# Patient Record
Sex: MALE | Race: ASIAN | NOT HISPANIC OR LATINO | Employment: OTHER | URBAN - METROPOLITAN AREA
[De-identification: names, ages, dates, MRNs, and addresses within clinical notes are randomized per-mention and may not be internally consistent; named-entity substitution may affect disease eponyms.]

---

## 2017-01-13 ENCOUNTER — HOSPITAL ENCOUNTER (OUTPATIENT)
Dept: LAB | Facility: MEDICAL CENTER | Age: 67
End: 2017-01-13
Attending: NURSE PRACTITIONER
Payer: COMMERCIAL

## 2017-01-13 LAB
ALBUMIN SERPL BCP-MCNC: 4.7 G/DL (ref 3.2–4.9)
ALBUMIN/GLOB SERPL: 1.9 G/DL
ALP SERPL-CCNC: 35 U/L (ref 30–99)
ALT SERPL-CCNC: 27 U/L (ref 2–50)
ANION GAP SERPL CALC-SCNC: 10 MMOL/L (ref 0–11.9)
AST SERPL-CCNC: 18 U/L (ref 12–45)
BILIRUB SERPL-MCNC: 1 MG/DL (ref 0.1–1.5)
BUN SERPL-MCNC: 18 MG/DL (ref 8–22)
CALCIUM SERPL-MCNC: 9.9 MG/DL (ref 8.5–10.5)
CHLORIDE SERPL-SCNC: 100 MMOL/L (ref 96–112)
CHOLEST SERPL-MCNC: 198 MG/DL (ref 100–199)
CO2 SERPL-SCNC: 27 MMOL/L (ref 20–33)
CREAT SERPL-MCNC: 0.96 MG/DL (ref 0.5–1.4)
CREAT UR-MCNC: 69 MG/DL
EST. AVERAGE GLUCOSE BLD GHB EST-MCNC: 137 MG/DL
GLOBULIN SER CALC-MCNC: 2.5 G/DL (ref 1.9–3.5)
GLUCOSE SERPL-MCNC: 125 MG/DL (ref 65–99)
HBA1C MFR BLD: 6.4 % (ref 0–5.6)
HDLC SERPL-MCNC: 64 MG/DL
LDLC SERPL CALC-MCNC: 122 MG/DL
MICROALBUMIN UR-MCNC: <0.7 MG/DL
MICROALBUMIN/CREAT UR: NORMAL MG/G (ref 0–30)
POTASSIUM SERPL-SCNC: 4 MMOL/L (ref 3.6–5.5)
PROT SERPL-MCNC: 7.2 G/DL (ref 6–8.2)
SODIUM SERPL-SCNC: 137 MMOL/L (ref 135–145)
TRIGL SERPL-MCNC: 62 MG/DL (ref 0–149)

## 2017-01-13 PROCEDURE — 83036 HEMOGLOBIN GLYCOSYLATED A1C: CPT

## 2017-01-13 PROCEDURE — 82043 UR ALBUMIN QUANTITATIVE: CPT

## 2017-01-13 PROCEDURE — 80053 COMPREHEN METABOLIC PANEL: CPT

## 2017-01-13 PROCEDURE — 80061 LIPID PANEL: CPT

## 2017-01-13 PROCEDURE — 36415 COLL VENOUS BLD VENIPUNCTURE: CPT

## 2017-01-13 PROCEDURE — 82570 ASSAY OF URINE CREATININE: CPT

## 2017-09-07 ENCOUNTER — HOSPITAL ENCOUNTER (OUTPATIENT)
Dept: LAB | Facility: MEDICAL CENTER | Age: 67
End: 2017-09-07
Attending: NURSE PRACTITIONER
Payer: MEDICARE

## 2017-09-07 LAB
ALBUMIN SERPL BCP-MCNC: 4.3 G/DL (ref 3.2–4.9)
ALBUMIN/GLOB SERPL: 1.6 G/DL
ALP SERPL-CCNC: 33 U/L (ref 30–99)
ALT SERPL-CCNC: 20 U/L (ref 2–50)
ANION GAP SERPL CALC-SCNC: 7 MMOL/L (ref 0–11.9)
AST SERPL-CCNC: 20 U/L (ref 12–45)
BILIRUB SERPL-MCNC: 0.7 MG/DL (ref 0.1–1.5)
BUN SERPL-MCNC: 15 MG/DL (ref 8–22)
CALCIUM SERPL-MCNC: 9.9 MG/DL (ref 8.5–10.5)
CHLORIDE SERPL-SCNC: 97 MMOL/L (ref 96–112)
CHOLEST SERPL-MCNC: 161 MG/DL (ref 100–199)
CO2 SERPL-SCNC: 28 MMOL/L (ref 20–33)
CREAT SERPL-MCNC: 1.01 MG/DL (ref 0.5–1.4)
CREAT UR-MCNC: 18.3 MG/DL
EST. AVERAGE GLUCOSE BLD GHB EST-MCNC: 148 MG/DL
FASTING STATUS PATIENT QL REPORTED: NORMAL
GFR SERPL CREATININE-BSD FRML MDRD: >60 ML/MIN/1.73 M 2
GLOBULIN SER CALC-MCNC: 2.7 G/DL (ref 1.9–3.5)
GLUCOSE SERPL-MCNC: 92 MG/DL (ref 65–99)
HAV IGM SERPL QL IA: NEGATIVE
HBA1C MFR BLD: 6.8 % (ref 0–5.6)
HBV CORE IGM SER QL: NEGATIVE
HBV SURFACE AG SER QL: NEGATIVE
HCV AB SER QL: NEGATIVE
HDLC SERPL-MCNC: 56 MG/DL
LDLC SERPL CALC-MCNC: 89 MG/DL
MICROALBUMIN UR-MCNC: <0.7 MG/DL
MICROALBUMIN/CREAT UR: NORMAL MG/G (ref 0–30)
POTASSIUM SERPL-SCNC: 4.8 MMOL/L (ref 3.6–5.5)
PROT SERPL-MCNC: 7 G/DL (ref 6–8.2)
PSA SERPL-MCNC: 1.45 NG/ML (ref 0–4)
SODIUM SERPL-SCNC: 132 MMOL/L (ref 135–145)
TRIGL SERPL-MCNC: 78 MG/DL (ref 0–149)

## 2017-09-07 PROCEDURE — 36415 COLL VENOUS BLD VENIPUNCTURE: CPT

## 2017-09-07 PROCEDURE — 84153 ASSAY OF PSA TOTAL: CPT | Mod: GA

## 2017-09-07 PROCEDURE — 80061 LIPID PANEL: CPT

## 2017-09-07 PROCEDURE — 80053 COMPREHEN METABOLIC PANEL: CPT

## 2017-09-07 PROCEDURE — 82043 UR ALBUMIN QUANTITATIVE: CPT

## 2017-09-07 PROCEDURE — 82570 ASSAY OF URINE CREATININE: CPT

## 2017-09-07 PROCEDURE — 83036 HEMOGLOBIN GLYCOSYLATED A1C: CPT | Mod: GA

## 2017-09-07 PROCEDURE — 80074 ACUTE HEPATITIS PANEL: CPT | Mod: GA

## 2017-12-01 ENCOUNTER — HOSPITAL ENCOUNTER (OUTPATIENT)
Dept: RADIOLOGY | Facility: MEDICAL CENTER | Age: 67
End: 2017-12-01
Attending: NURSE PRACTITIONER
Payer: MEDICARE

## 2017-12-01 DIAGNOSIS — Z13.6 SCREENING FOR ISCHEMIC HEART DISEASE: ICD-10-CM

## 2017-12-01 PROCEDURE — 76775 US EXAM ABDO BACK WALL LIM: CPT

## 2018-05-05 ENCOUNTER — OFFICE VISIT (OUTPATIENT)
Dept: URGENT CARE | Facility: PHYSICIAN GROUP | Age: 68
End: 2018-05-05
Payer: MEDICARE

## 2018-05-05 VITALS
SYSTOLIC BLOOD PRESSURE: 112 MMHG | WEIGHT: 149.8 LBS | BODY MASS INDEX: 26.54 KG/M2 | TEMPERATURE: 98.1 F | OXYGEN SATURATION: 95 % | DIASTOLIC BLOOD PRESSURE: 60 MMHG | HEIGHT: 63 IN | HEART RATE: 65 BPM

## 2018-05-05 DIAGNOSIS — S00.83XA CONTUSION, CHEEK, INITIAL ENCOUNTER: ICD-10-CM

## 2018-05-05 DIAGNOSIS — H11.31 SUBCONJUNCTIVAL HEMORRHAGE OF RIGHT EYE: Primary | ICD-10-CM

## 2018-05-05 PROCEDURE — 99203 OFFICE O/P NEW LOW 30 MIN: CPT | Performed by: PHYSICIAN ASSISTANT

## 2018-05-05 RX ORDER — MELOXICAM 15 MG/1
15 TABLET ORAL DAILY
COMMUNITY
End: 2023-10-19

## 2018-05-05 RX ORDER — LISINOPRIL 10 MG/1
10 TABLET ORAL DAILY
COMMUNITY
End: 2018-07-23

## 2018-05-05 ASSESSMENT — ENCOUNTER SYMPTOMS
DIZZINESS: 0
BLURRED VISION: 0
DOUBLE VISION: 0
EYE PAIN: 0
HEADACHES: 0
PHOTOPHOBIA: 0
EYE DISCHARGE: 0
EYE REDNESS: 1

## 2018-05-05 NOTE — PROGRESS NOTES
"Subjective:      Sveta Knox is a 67 y.o. male who presents with Eye Problem (R eye got hit with a pipe, redness, swelling, pt states vision is ok, onset 5pm yesterday )    PMH:  Reviewed with patient/family member/EPIC.   MEDS:   Current Outpatient Prescriptions:   •  meloxicam (MOBIC) 15 MG tablet, Take 15 mg by mouth every day., Disp: , Rfl:   •  lisinopril (PRINIVIL) 10 MG Tab, Take 10 mg by mouth every day., Disp: , Rfl:   •  METFORMIN HCL PO, Take  by mouth., Disp: , Rfl:   •  fluticasone (FLONASE) 50 MCG/ACT nasal spray, Spray 1 Spray in nose every day., Disp: , Rfl:   ALLERGIES:   Allergies   Allergen Reactions   • Bactine    • Keflex      SURGHX: History reviewed. No pertinent surgical history.  SOCHX:  reports that he has never smoked. He has never used smokeless tobacco. He reports that he drinks alcohol. He reports that he does not use drugs.  FH: Reviewed with patient/family. Not pertinent to this complaint.          PT presents to clinic with concern about his right eye. PT was working on a plumbing pipe at a friend's home yesterday and hit himself in the face after the pip \"broke free\" and his hand or the pipe hit him in the face.  PT states he got a scratch on his cheek but did not feel anything hit his eye, but woke up this morning with a red spot in the conjunctiva of his eye.      PT denies eyeball pain, vision changes, headache or inability to move eye, just the red spot.  PT denies facial swelling or tenderness. PT wears glasses to drive but was not wearing them at the time.     PT denies any other complaint.       Eye Problem    The right eye is affected. This is a new problem. The current episode started yesterday. The problem occurs constantly. The problem has been unchanged. The pain is at a severity of 0/10. The patient is experiencing no pain. There is no known exposure to pink eye. He does not wear contacts. Associated symptoms include eye redness. Pertinent negatives include no " "blurred vision, eye discharge, double vision or photophobia.       Review of Systems   Eyes: Positive for redness. Negative for blurred vision, double vision, photophobia, pain and discharge.   Neurological: Negative for dizziness and headaches.   All other systems reviewed and are negative.         Objective:     /60   Pulse 65   Temp 36.7 °C (98.1 °F)   Ht 1.6 m (5' 3\")   Wt 67.9 kg (149 lb 12.8 oz)   SpO2 95%   BMI 26.54 kg/m²      Physical Exam   Constitutional: He is oriented to person, place, and time. Vital signs are normal. He appears well-developed and well-nourished. No distress.   HENT:   Head: Normocephalic. Head is with abrasion. Head is without raccoon's eyes and without Nj's sign.       Right Ear: Tympanic membrane normal.   Left Ear: Tympanic membrane normal.   Nose: Nose normal.   Mouth/Throat: Uvula is midline, oropharynx is clear and moist and mucous membranes are normal.   3 mm linear abrasion to cheek, no bony tenderness, no crepitus or step off.    Eyes: EOM are normal. Pupils are equal, round, and reactive to light. Lids are everted and swept, no foreign bodies found. Right conjunctiva has a hemorrhage. No scleral icterus.   Slit lamp exam:       The right eye shows no hyphema and no fluorescein uptake.       Subconjunctival hemorrhage to lateral aspect of left eye.   No fluroscein uptake in eye, neg Alondra sign   EOMI intact in all directions, no evidence of entrapment of muscles.       Neck: Normal range of motion. Neck supple. No JVD present.   Cardiovascular: Normal rate, regular rhythm and normal heart sounds.    Pulmonary/Chest: Effort normal and breath sounds normal.   Abdominal: Soft.   Musculoskeletal: Normal range of motion.   Lymphadenopathy:     He has no cervical adenopathy.   Neurological: He is alert and oriented to person, place, and time. No cranial nerve deficit or sensory deficit. Coordination normal.   Skin: Skin is warm and dry. Capillary refill takes less " than 2 seconds.               Assessment/Plan:     1. Subconjunctival hemorrhage of right eye     2. Contusion, cheek, initial encounter     I did not see any obvious injury to the globe, and there was no uptake noted on Woods lamp exam, so pt can follow up with his Eye Doctor if he notes any change in symptoms.   PT Td is up to date.   PT to use antibiotic ointment if desired on abrasion.    PT was given strict ER precautions.     PT should follow up with PCP in 1-2 days for re-evaluation if symptoms have not improved.  Discussed red flags and reasons to return to UC or ED.  Pt and/or family verbalized understanding of diagnosis and follow up instructions and was offered informational handout on diagnosis.  PT discharged.

## 2018-05-05 NOTE — PATIENT INSTRUCTIONS
Subconjunctival Hemorrhage  Subconjunctival hemorrhage is bleeding that happens between the white part of your eye (sclera) and the clear membrane that covers the outside of your eye (conjunctiva). There are many tiny blood vessels near the surface of your eye. A subconjunctival hemorrhage happens when one or more of these vessels breaks and bleeds, causing a red patch to appear on your eye. This is similar to a bruise.  Depending on the amount of bleeding, the red patch may only cover a small area of your eye or it may cover the entire visible part of the sclera. If a lot of blood collects under the conjunctiva, there may also be swelling. Subconjunctival hemorrhages do not affect your vision or cause pain, but your eye may feel irritated if there is swelling. Subconjunctival hemorrhages usually do not require treatment, and they disappear on their own within two weeks.  What are the causes?  This condition may be caused by:  · Mild trauma, such as rubbing your eye too hard.  · Severe trauma or blunt injuries.  · Coughing, sneezing, or vomiting.  · Straining, such as when lifting a heavy object.  · High blood pressure.  · Recent eye surgery.  · A history of diabetes.  · Certain medicines, especially blood thinners (anticoagulants).  · Other conditions, such as eye tumors, bleeding disorders, or blood vessel abnormalities.  Subconjunctival hemorrhages can happen without an obvious cause.  What are the signs or symptoms?  Symptoms of this condition include:  · A bright red or dark red patch on the white part of the eye.  ¨ The red area may spread out to cover a larger area of the eye before it goes away.  ¨ The red area may turn brownish-yellow before it goes away.  · Swelling.  · Mild eye irritation.  How is this diagnosed?  This condition is diagnosed with a physical exam. If your subconjunctival hemorrhage was caused by trauma, your health care provider may refer you to an eye specialist (ophthalmologist) or  another specialist to check for other injuries. You may have other tests, including:  · An eye exam.  · A blood pressure check.  · Blood tests to check for bleeding disorders.  If your subconjunctival hemorrhage was caused by trauma, X-rays or a CT scan may be done to check for other injuries.  How is this treated?  Usually, no treatment is needed. Your health care provider may recommend eye drops or cold compresses to help with discomfort.  Follow these instructions at home:  · Take over-the-counter and prescription medicines only as directed by your health care provider.  · Use eye drops or cold compresses to help with discomfort as directed by your health care provider.  · Avoid activities, things, and environments that may irritate or injure your eye.  · Keep all follow-up visits as told by your health care provider. This is important.  Contact a health care provider if:  · You have pain in your eye.  · The bleeding does not go away within 3 weeks.  · You keep getting new subconjunctival hemorrhages.  Get help right away if:  · Your vision changes or you have difficulty seeing.  · You suddenly develop severe sensitivity to light.  · You develop a severe headache, persistent vomiting, confusion, or abnormal tiredness (lethargy).  · Your eye seems to bulge or protrude from your eye socket.  · You develop unexplained bruises on your body.  · You have unexplained bleeding in another area of your body.  This information is not intended to replace advice given to you by your health care provider. Make sure you discuss any questions you have with your health care provider.  Document Released: 12/18/2006 Document Revised: 08/13/2017 Document Reviewed: 02/24/2016  Elsevier Interactive Patient Education © 2017 [employer]

## 2018-07-23 ENCOUNTER — OFFICE VISIT (OUTPATIENT)
Dept: MEDICAL GROUP | Facility: MEDICAL CENTER | Age: 68
End: 2018-07-23
Payer: MEDICARE

## 2018-07-23 VITALS
DIASTOLIC BLOOD PRESSURE: 68 MMHG | TEMPERATURE: 97 F | SYSTOLIC BLOOD PRESSURE: 116 MMHG | OXYGEN SATURATION: 93 % | BODY MASS INDEX: 25.87 KG/M2 | RESPIRATION RATE: 16 BRPM | WEIGHT: 146 LBS | HEART RATE: 64 BPM | HEIGHT: 63 IN

## 2018-07-23 DIAGNOSIS — I10 ESSENTIAL HYPERTENSION: ICD-10-CM

## 2018-07-23 DIAGNOSIS — E11.9 TYPE 2 DIABETES MELLITUS WITHOUT COMPLICATION, WITHOUT LONG-TERM CURRENT USE OF INSULIN (HCC): ICD-10-CM

## 2018-07-23 DIAGNOSIS — M19.041 PRIMARY OSTEOARTHRITIS OF RIGHT HAND: ICD-10-CM

## 2018-07-23 DIAGNOSIS — E78.5 HYPERLIPIDEMIA, UNSPECIFIED HYPERLIPIDEMIA TYPE: ICD-10-CM

## 2018-07-23 PROCEDURE — 99214 OFFICE O/P EST MOD 30 MIN: CPT | Performed by: FAMILY MEDICINE

## 2018-07-23 RX ORDER — LISINOPRIL AND HYDROCHLOROTHIAZIDE 20; 12.5 MG/1; MG/1
TABLET ORAL
COMMUNITY
End: 2018-07-23 | Stop reason: SDUPTHER

## 2018-07-23 RX ORDER — PRAVASTATIN SODIUM 40 MG
TABLET ORAL
COMMUNITY
End: 2018-07-23 | Stop reason: SDUPTHER

## 2018-07-23 RX ORDER — LISINOPRIL AND HYDROCHLOROTHIAZIDE 20; 12.5 MG/1; MG/1
1 TABLET ORAL DAILY
Qty: 90 TAB | Refills: 1 | Status: SHIPPED | OUTPATIENT
Start: 2018-07-23 | End: 2019-02-19 | Stop reason: SDUPTHER

## 2018-07-23 RX ORDER — PRAVASTATIN SODIUM 40 MG
40 TABLET ORAL DAILY
Qty: 90 TAB | Refills: 1 | Status: SHIPPED | OUTPATIENT
Start: 2018-07-23 | End: 2020-07-06 | Stop reason: SDUPTHER

## 2018-07-23 ASSESSMENT — PATIENT HEALTH QUESTIONNAIRE - PHQ9: CLINICAL INTERPRETATION OF PHQ2 SCORE: 0

## 2018-07-23 NOTE — LETTER
[facility]  Nicko Ferreira M.D.  [address] [facility] [address]  [place] [address]  Fax: [phone]   Authorization for Release/Disclosure of   Protected Health Information   Name: SVETA KNOX : 1950 SSN: [id number]   Address: [facility] [address]  [place] [address] Phone:    [phone] (home)    I authorize the entity listed below to release/disclose the PHI below to:   [facility]/Nicko Ferreira M.D. and Nicko Ferreira M.D.   Provider or Entity Name:    Dr. Elissa Douglass   Address   City, [facility]   Phone:      Fax:     Reason for request: continuity of care   Information to be released:    [  ] LAST COLONOSCOPY,  including any PATH REPORT and follow-up  [  ] LAST FIT/COLOGUARD RESULT [  ] LAST DEXA  [  ] LAST MAMMOGRAM  [  ] LAST PAP  [  ] LAST LABS [  ] RETINA EXAM REPORT  [  ] IMMUNIZATION RECORDS  [ X ] Release all info      [  ] Check here and initial the line next to each item to release ALL health information INCLUDING  _____ Care and treatment for drug and / or alcohol abuse  _____ HIV testing, infection status, or AIDS  _____ Genetic Testing    DATES OF SERVICE OR TIME PERIOD TO BE DISCLOSED: _____________  I understand and acknowledge that:  * This Authorization may be revoked at any time by you in writing, except if your health information has already been used or disclosed.  * Your health information that will be used or disclosed as a result of you signing this authorization could be re-disclosed by the recipient. If this occurs, your re-disclosed health information may no longer be protected by State or Federal laws.  * You may refuse to sign this Authorization. Your refusal will not affect your ability to obtain treatment.  * This Authorization becomes effective upon signing and will  on (date) __________.      If no date is indicated, this Authorization will  one (1) year from the signature date.    Name: Sveta Knox    Signature:   Date:     2018          PLEASE FAX REQUESTED RECORDS BACK TO: [phone]

## 2018-07-23 NOTE — PROGRESS NOTES
"cc:  diabetes    Subjective:     Sveta Knox is a 68 y.o. male presenting to establish care:    Diabetes, hypertension, hyperlipidemia:  --asa: Does not take an aspirin  --a1c: last value was 6.8 on 9/2017.  BGs at home are not checked.  Is currently taking metformin 500mg 3-4 times a day with meals. Denies any hypoglycemic episodes. Has started exercising 3-4 times a week. Diet is well-rounded.  --BP: History of arterial hypertension. Normal today.  Has been normotensive. Denies any chest pain, sob, leg swelling.  Is currently on lisinopril-hctz 20-12.5mg daily.  Last CMP was 9/2017.  --LDL: History of elevated cholesterol. Last checked 9/2017.  Is on pravastatin 40mg daily.  No myalgias. He developed left-sided weakness with atorvastatin and simvastatin  --tobacco: Former smoker  --eye exam: last exam several months ago.  No vision changes.  --foot exam: last exam unknown.  Denies any foot, skin problems. He does have some numbness on the top of his left foot, but this has been stable. He  --microalbumin: last checked 9/2017.  No urinary symptoms.      Review of systems:  See above.       Current Outpatient Prescriptions:   •  metFORMIN (GLUCOPHAGE) 500 MG Tab, Take 1 Tab by mouth 4 times a day., Disp: 360 Tab, Rfl: 1  •  pravastatin (PRAVACHOL) 40 MG tablet, Take 1 Tab by mouth every day., Disp: 90 Tab, Rfl: 1  •  lisinopril-hydrochlorothiazide (PRINZIDE, ZESTORETIC) 20-12.5 MG per tablet, Take 1 Tab by mouth every day., Disp: 90 Tab, Rfl: 1  •  meloxicam (MOBIC) 15 MG tablet, Take 15 mg by mouth every day., Disp: , Rfl:   •  fluticasone (FLONASE) 50 MCG/ACT nasal spray, Spray 1 Spray in nose every day., Disp: , Rfl:     Allergies, past medical history, past surgical history, family history, social history reviewed and updated    Objective:     Vitals: /68   Pulse 64   Temp 36.1 °C (97 °F)   Resp 16   Ht 1.6 m (5' 3\")   Wt 66.2 kg (146 lb)   SpO2 93%   BMI 25.86 kg/m²   General: Alert, " pleasant, NAD  HEENT: Normocephalic.  PERRL, EOMI, no icterus or pallor.  Conjunctivae and lids normal. External ears normal. Oropharynx non-erythematous, mucous membranes moist.  Neck supple.  No thyromegaly or masses palpated. No cervical or supraclavicular lymphadenopathy.  Heart: Regular rate and rhythm.  S1 and S2 normal.  No murmurs appreciated.  Respiratory: Normal respiratory effort.  Clear to auscultation bilaterally.  Abdomen: Non-distended, soft  Skin: Warm, dry, no rashes.  Musculoskeletal: Gait is normal.  Moves all extremities well.  Extremities:    Distal pulses 2+ symmetric.  No edema; toe nails clean.  Thickened skin over heals and medial great toes.  Temperature and vibratory sensation intact.  Normal monofilament.  Achilles reflexes 2+ symmetric.  Neurological: No tremors  Psych:  Affect/mood is normal, judgement is good, memory is intact, grooming is appropriate.    Assessment/Plan:     Sveta was seen today for hypertension, diabetes mellitus and establish care.    Diagnoses and all orders for this visit:    Essential hypertension  Stable, continue lisinopril-hydrochlorothiazide  -     BASIC METABOLIC PANEL; Future  -     lisinopril-hydrochlorothiazide (PRINZIDE, ZESTORETIC) 20-12.5 MG per tablet; Take 1 Tab by mouth every day.    Type 2 diabetes mellitus without complication, without long-term current use of insulin (HCC)  Stable, continue metformin. Records requested. Labs ordered for next time. Discussed foot care. Follow-up in 3-4 months. We'll discuss baby aspirin at his next visit  -     HEMOGLOBIN A1C; Future  -     BASIC METABOLIC PANEL; Future  -     MICROALBUMIN CREAT RATIO URINE; Future  -     Diabetic Monofilament LE Exam  -     metFORMIN (GLUCOPHAGE) 500 MG Tab; Take 1 Tab by mouth 4 times a day.    Hyperlipidemia, unspecified hyperlipidemia type  Stable, continue pravastatin. We'll discuss aspirin at his next visit  -     BASIC METABOLIC PANEL; Future  -     LIPID PROFILE;  Future  -     pravastatin (PRAVACHOL) 40 MG tablet; Take 1 Tab by mouth every day.    Primary osteoarthritis of right hand  Stable, continue meloxicam as needed. He reports taking it twice a week      Return in about 4 months (around 11/23/2018) for routine follow up, Diabetes.

## 2018-07-23 NOTE — LETTER
[facility]  Nicko Ferreira M.D.  [address]  [place] [address]  Fax: [phone]   Authorization for Release/Disclosure of   Protected Health Information   Name: MAL TAN : 1950 SSN: [id number]   Address: [facility] [address] [place] [address] Phone:    [phone] (home)    I authorize the entity listed below to release/disclose the PHI below to:   [facility]/Nicko Ferreira M.D. and Nicko Ferreira M.D.   Provider or Entity Name:  [facility]   Address   City, [facility], Zip:               [address] [place] [address], [place] [address]   Phone:  [phone]      Fax:      [phone]        Reason for request: continuity of care   Information to be released:    [ X ] LAST COLONOSCOPY,  including any PATH REPORT and follow-up  [ X ] LAST FIT/COLOGUARD RESULT [  ] LAST DEXA  [  ] LAST MAMMOGRAM  [  ] LAST PAP  [  ] LAST LABS [  ] RETINA EXAM REPORT  [  ] IMMUNIZATION RECORDS  [  ] Release all info      [  ] Check here and initial the line next to each item to release ALL health information INCLUDING  _____ Care and treatment for drug and / or alcohol abuse  _____ HIV testing, infection status, or AIDS  _____ Genetic Testing    DATES OF SERVICE OR TIME PERIOD TO BE DISCLOSED: _____________  I understand and acknowledge that:  * This Authorization may be revoked at any time by you in writing, except if your health information has already been used or disclosed.  * Your health information that will be used or disclosed as a result of you signing this authorization could be re-disclosed by the recipient. If this occurs, your re-disclosed health information may no longer be protected by State or Federal laws.  * You may refuse to sign this Authorization. Your refusal will not affect your ability to obtain treatment.  * This Authorization becomes effective upon signing and will  on (date) __________.      If no date is indicated, this Authorization will  one (1)  year from the signature date.    Name: Sveta Knox    Signature:   Date:     7/23/2018       PLEASE FAX REQUESTED RECORDS BACK TO: [phone]

## 2018-07-23 NOTE — LETTER
[facility]  Nicko Ferreira M.D.  [address] [facility] [address]  [place] [address]  Fax: [phone]   Authorization for Release/Disclosure of   Protected Health Information   Name: SVETA KNOX : 1950 SSN: [id number]   Address: [facility] [address]  [place] [address] Phone:    [phone] (home)    I authorize the entity listed below to release/disclose the PHI below to:   [facility]/Nicko Ferreira M.D. and Nicko Ferreira M.D.   Provider or Entity Name:   Dr Lo   Address   City, [facility]   Phone:      Fax:     Reason for request: continuity of care   Information to be released:    [  ] LAST COLONOSCOPY,  including any PATH REPORT and follow-up  [  ] LAST FIT/COLOGUARD RESULT [  ] LAST DEXA  [  ] LAST MAMMOGRAM  [  ] LAST PAP  [  ] LAST LABS [ x] RETINA EXAM REPORT  [  ] IMMUNIZATION RECORDS  [  ] Release all info      [  ] Check here and initial the line next to each item to release ALL health information INCLUDING  _____ Care and treatment for drug and / or alcohol abuse  _____ HIV testing, infection status, or AIDS  _____ Genetic Testing    DATES OF SERVICE OR TIME PERIOD TO BE DISCLOSED: _____________  I understand and acknowledge that:  * This Authorization may be revoked at any time by you in writing, except if your health information has already been used or disclosed.  * Your health information that will be used or disclosed as a result of you signing this authorization could be re-disclosed by the recipient. If this occurs, your re-disclosed health information may no longer be protected by State or Federal laws.  * You may refuse to sign this Authorization. Your refusal will not affect your ability to obtain treatment.  * This Authorization becomes effective upon signing and will  on (date) __________.      If no date is indicated, this Authorization will  one (1) year from the signature date.    Name: Sveta Knox    Signature:   Date:     2018            PLEASE FAX REQUESTED RECORDS BACK TO: [phone]

## 2018-08-03 ENCOUNTER — HOSPITAL ENCOUNTER (OUTPATIENT)
Dept: LAB | Facility: MEDICAL CENTER | Age: 68
End: 2018-08-03
Attending: FAMILY MEDICINE
Payer: MEDICARE

## 2018-08-03 DIAGNOSIS — E78.5 HYPERLIPIDEMIA, UNSPECIFIED HYPERLIPIDEMIA TYPE: ICD-10-CM

## 2018-08-03 DIAGNOSIS — E11.9 TYPE 2 DIABETES MELLITUS WITHOUT COMPLICATION, WITHOUT LONG-TERM CURRENT USE OF INSULIN (HCC): ICD-10-CM

## 2018-08-03 DIAGNOSIS — I10 ESSENTIAL HYPERTENSION: ICD-10-CM

## 2018-08-03 LAB
ANION GAP SERPL CALC-SCNC: 8 MMOL/L (ref 0–11.9)
BUN SERPL-MCNC: 13 MG/DL (ref 8–22)
CALCIUM SERPL-MCNC: 9.8 MG/DL (ref 8.5–10.5)
CHLORIDE SERPL-SCNC: 104 MMOL/L (ref 96–112)
CHOLEST SERPL-MCNC: 175 MG/DL (ref 100–199)
CO2 SERPL-SCNC: 28 MMOL/L (ref 20–33)
CREAT SERPL-MCNC: 1.01 MG/DL (ref 0.5–1.4)
CREAT UR-MCNC: 24.4 MG/DL
EST. AVERAGE GLUCOSE BLD GHB EST-MCNC: 140 MG/DL
GLUCOSE SERPL-MCNC: 108 MG/DL (ref 65–99)
HBA1C MFR BLD: 6.5 % (ref 0–5.6)
HDLC SERPL-MCNC: 58 MG/DL
LDLC SERPL CALC-MCNC: 102 MG/DL
MICROALBUMIN UR-MCNC: <0.7 MG/DL
MICROALBUMIN/CREAT UR: NORMAL MG/G (ref 0–30)
POTASSIUM SERPL-SCNC: 3.8 MMOL/L (ref 3.6–5.5)
SODIUM SERPL-SCNC: 140 MMOL/L (ref 135–145)
TRIGL SERPL-MCNC: 77 MG/DL (ref 0–149)

## 2018-08-03 PROCEDURE — 83036 HEMOGLOBIN GLYCOSYLATED A1C: CPT | Mod: GA

## 2018-08-03 PROCEDURE — 82570 ASSAY OF URINE CREATININE: CPT

## 2018-08-03 PROCEDURE — 80061 LIPID PANEL: CPT

## 2018-08-03 PROCEDURE — 82043 UR ALBUMIN QUANTITATIVE: CPT

## 2018-08-03 PROCEDURE — 36415 COLL VENOUS BLD VENIPUNCTURE: CPT | Mod: GA

## 2018-08-03 PROCEDURE — 80048 BASIC METABOLIC PNL TOTAL CA: CPT

## 2018-08-20 ENCOUNTER — TELEPHONE (OUTPATIENT)
Dept: MEDICAL GROUP | Facility: MEDICAL CENTER | Age: 68
End: 2018-08-20

## 2018-08-20 DIAGNOSIS — E11.9 TYPE 2 DIABETES MELLITUS WITHOUT COMPLICATION, WITHOUT LONG-TERM CURRENT USE OF INSULIN (HCC): ICD-10-CM

## 2018-08-20 NOTE — TELEPHONE ENCOUNTER
1. Caller Name: Sveta                      Call Back Number: [phone] (home)       2. Message: patient would like a script for the freestyle darin meter and strips sent to his pharmacy.    3. Patient approves office to leave a detailed voicemail/MyChart message: N\A

## 2018-08-24 DIAGNOSIS — E11.9 TYPE 2 DIABETES MELLITUS WITHOUT COMPLICATION, WITHOUT LONG-TERM CURRENT USE OF INSULIN (HCC): ICD-10-CM

## 2018-09-19 DIAGNOSIS — E11.9 TYPE 2 DIABETES MELLITUS WITHOUT COMPLICATION, WITHOUT LONG-TERM CURRENT USE OF INSULIN (HCC): ICD-10-CM

## 2018-09-20 RX ORDER — FLASH GLUCOSE SENSOR
KIT MISCELLANEOUS
Qty: 9 EACH | Refills: 0 | Status: SHIPPED | OUTPATIENT
Start: 2018-09-20 | End: 2020-01-31

## 2018-11-14 ENCOUNTER — TELEPHONE (OUTPATIENT)
Dept: MEDICAL GROUP | Facility: MEDICAL CENTER | Age: 68
End: 2018-11-14

## 2018-11-14 NOTE — TELEPHONE ENCOUNTER
1. Caller Name: Sveta Oleary Look                                         Call Back Number: [phone] (home)       Patient approves a detailed voicemail message: yes    Pt. Inquiring whether he needs to get labs done before he comes to his appointment on the 26th? Pt. Did not specify which labs. Please advise

## 2018-11-15 ENCOUNTER — TELEPHONE (OUTPATIENT)
Dept: MEDICAL GROUP | Facility: MEDICAL CENTER | Age: 68
End: 2018-11-15

## 2018-11-15 NOTE — TELEPHONE ENCOUNTER
1. Caller Name: Sveta Oleary Look                                         Call Back Number: [phone] (home)       Patient approves a detailed voicemail message: N\A    Contacted pt about him not needing labs prior to his visit.

## 2018-11-26 ENCOUNTER — OFFICE VISIT (OUTPATIENT)
Dept: MEDICAL GROUP | Facility: MEDICAL CENTER | Age: 68
End: 2018-11-26
Payer: MEDICARE

## 2018-11-26 VITALS
HEART RATE: 74 BPM | RESPIRATION RATE: 16 BRPM | WEIGHT: 145 LBS | SYSTOLIC BLOOD PRESSURE: 118 MMHG | HEIGHT: 63 IN | BODY MASS INDEX: 25.69 KG/M2 | OXYGEN SATURATION: 96 % | DIASTOLIC BLOOD PRESSURE: 72 MMHG | TEMPERATURE: 98.7 F

## 2018-11-26 DIAGNOSIS — E11.9 TYPE 2 DIABETES MELLITUS WITHOUT COMPLICATION, WITHOUT LONG-TERM CURRENT USE OF INSULIN (HCC): ICD-10-CM

## 2018-11-26 DIAGNOSIS — Z00.00 MEDICARE ANNUAL WELLNESS VISIT, INITIAL: ICD-10-CM

## 2018-11-26 DIAGNOSIS — M19.041 PRIMARY OSTEOARTHRITIS OF RIGHT HAND: ICD-10-CM

## 2018-11-26 DIAGNOSIS — E78.5 HYPERLIPIDEMIA, UNSPECIFIED HYPERLIPIDEMIA TYPE: ICD-10-CM

## 2018-11-26 DIAGNOSIS — I10 ESSENTIAL HYPERTENSION: ICD-10-CM

## 2018-11-26 LAB
HBA1C MFR BLD: 6.4 % (ref ?–5.8)
INT CON NEG: NORMAL
INT CON POS: NORMAL

## 2018-11-26 PROCEDURE — 83036 HEMOGLOBIN GLYCOSYLATED A1C: CPT | Performed by: FAMILY MEDICINE

## 2018-11-26 PROCEDURE — G0438 PPPS, INITIAL VISIT: HCPCS | Performed by: FAMILY MEDICINE

## 2018-11-26 ASSESSMENT — ACTIVITIES OF DAILY LIVING (ADL): BATHING_REQUIRES_ASSISTANCE: 0

## 2018-11-26 ASSESSMENT — ENCOUNTER SYMPTOMS: GENERAL WELL-BEING: GOOD

## 2018-11-26 ASSESSMENT — PATIENT HEALTH QUESTIONNAIRE - PHQ9: CLINICAL INTERPRETATION OF PHQ2 SCORE: 0

## 2018-11-26 NOTE — PROGRESS NOTES
Cc: diabetes, wellness    HPI:  Sveta Knox is a 68 y.o. here for Medicare Annual Wellness Visit     Patient Active Problem List    Diagnosis Date Noted   • Essential hypertension 07/23/2018   • Type 2 diabetes mellitus without complication, without long-term current use of insulin (HCC) 07/23/2018   • Hyperlipidemia 07/23/2018   • Primary osteoarthritis of right hand 07/23/2018       Current Outpatient Prescriptions   Medication Sig Dispense Refill   • Continuous Blood Gluc Sensor (FREESTYLE LE SENSOR SYSTEM) [facility] [place] USE TO TEST BLOOD SUGAR DAILY E11.9 9 Each 0   • Blood Glucose Monitoring Suppl Device Meter: one freestyle le meter. Sig. Use 1x/day for blood sugar monitoring. Dx: e11.9 1 Device 0   • metFORMIN (GLUCOPHAGE) 500 MG Tab Take 1 Tab by mouth 4 times a day. 360 Tab 1   • pravastatin (PRAVACHOL) 40 MG tablet Take 1 Tab by mouth every day. 90 Tab 1   • lisinopril-hydrochlorothiazide (PRINZIDE, ZESTORETIC) 20-12.5 MG per tablet Take 1 Tab by mouth every day. 90 Tab 1   • meloxicam (MOBIC) 15 MG tablet Take 15 mg by mouth every day.     • fluticasone (FLONASE) 50 MCG/ACT nasal spray Spray 1 Spray in nose every day.       No current facility-administered medications for this visit.             Current supplements as per medication list.       Allergies: Atorvastatin; Bactine; Keflex; and Simvastatin    Current social contact/activities: shooting     He  reports that he has quit smoking. He has never used smokeless tobacco. He reports that he drinks alcohol. He reports that he does not use drugs.  Counseling given: Yes      DPA/Advanced Directive:  Patient does not have an Advanced Directive.  A packet and workshop information was given on Advanced Directives.    ROS:    Gait: Uses no assistive device  Ostomy: No  Other tubes: No  Amputations: No  Chronic oxygen use: No  Last eye exam: couple months ago  Wears hearing aids: No   : Denies any urinary leakage during the last 6  months    Screening:    Depression Screening    Little interest or pleasure in doing things?  0 - not at all  Feeling down, depressed , or hopeless? 0 - not at all  Patient Health Questionnaire Score: 0     If depressive symptoms identified deferred to follow up visit unless specifically addressed in assessment and plan.    Interpretation of PHQ-9 Total Score   Score Severity   1-4 No Depression   5-9 Mild Depression   10-14 Moderate Depression   15-19 Moderately Severe Depression   20-27 Severe Depression    Screening for Cognitive Impairment    Three Minute Recall (leader, season, table) 1/3    Kai clock face with all 12 numbers and set the hands to show 10 past 11.  Yes 5/5  Cognitive concerns identified deferred for follow up unless specifically addressed in assessment and plan.    Fall Risk Assessment    Has the patient had two or more falls in the last year or any fall with injury in the last year?  No    Safety Assessment    Throw rugs on floor.  Yes  Handrails on all stairs.  Yes  Good lighting in all hallways.  Yes  Difficulty hearing.  No  Patient counseled about all safety risks that were identified.    Functional Assessment ADLs    Are there any barriers preventing you from cooking for yourself or meeting nutritional needs?  No.    Are there any barriers preventing you from driving safely or obtaining transportation?  No.    Are there any barriers preventing you from using a telephone or calling for help?  No.    Are there any barriers preventing you from shopping?  No.    Are there any barriers preventing you from taking care of your own finances?  No.    Are there any barriers preventing you from managing your medications?  No.    Are there any barriers preventing you from showering, bathing or dressing yourself?  No.    Are you currently engaging in any exercise or physical activity?  Yes.     What is your perception of your health?  Good.      Health Maintenance Summary                Annual  "Wellness Visit Overdue 1950     RETINAL SCREENING Overdue 5/12/1970     IMM ZOSTER VACCINES Postponed 11/26/2019 Originally 5/12/2002. Insurance/Financial    IMM HEP B VACCINE Postponed 11/26/2023 Originally 5/12/1971. Provider advises postponing for medical reasons    A1C SCREENING Next Due 2/3/2019      Done 8/3/2018 HEMOGLOBIN A1C      Patient has more history with this topic...    COLONOSCOPY Next Due 4/2/2019      Done 4/2/2009 REFERRAL TO GI FOR COLONOSCOPY    DIABETES MONOFILAMENT / LE EXAM Next Due 7/23/2019      Done 7/23/2018 AMB DIABETIC MONOFILAMENT LOWER EXTREMITY EXAM    FASTING LIPID PROFILE Next Due 8/3/2019      Done 8/3/2018 LIPID PROFILE      Patient has more history with this topic...    URINE ACR / MICROALBUMIN Next Due 8/3/2019      Done 8/3/2018 MICROALBUMIN CREAT RATIO URINE     Patient has more history with this topic...    SERUM CREATININE Next Due 8/3/2019      Done 8/3/2018 BASIC METABOLIC PANEL      Patient has more history with this topic...    IMM PNEUMOCOCCAL 65+ (ADULT) LOW/MEDIUM RISK SERIES Next Due 8/15/2019      Done 8/15/2018 Imm Admin: Pneumococcal Conjugate Vaccine (Prevnar/PCV-13)     Patient has more history with this topic...    IMM DTaP/Tdap/Td Vaccine Next Due 2/25/2023      Done 2/25/2013 Imm Admin: Tdap Vaccine          Patient Care Team:  Nicko Ferreira M.D. as PCP - General (Family Medicine)  Srini Malone M.D. (Ophthalmology)        Social History   Substance Use Topics   • Smoking status: Former Smoker   • Smokeless tobacco: Never Used   • Alcohol use Yes      Comment: about 1 drink/week     History reviewed. No pertinent family history.  He  has no past medical history on file.   History reviewed. No pertinent surgical history.    Exam:   Blood pressure 118/72, pulse 74, temperature 37.1 °C (98.7 °F), resp. rate 16, height 1.6 m (5' 3\"), weight 65.8 kg (145 lb), SpO2 96 %. Body mass index is 25.69 kg/m².    Hearing fair.    Dentition fair  Alert, " oriented in no acute distress.  Eye contact is good, speech goal directed, affect calm    Assessment and Plan. The following treatment and monitoring plan is recommended:      Medicare annual wellness visit, initial  -     Initial Wellness Visit - Includes PPPS ()    Type 2 diabetes mellitus without complication, without long-term current use of insulin (HCC)  Stable, continue current medication.  Follow-up in 4 months.  -     POCT Hemoglobin A1C    Essential hypertension  Stable, continue current medication.  Follow-up in 4 months.    Hyperlipidemia, unspecified hyperlipidemia type  Stable, continue current medication.  Follow-up in 4 months.    Primary osteoarthritis of right hand  Stable, continue intermittent meloxicam use.  -     Initial Wellness Visit - Includes PPPS ()        Services suggested: No services needed at this time  Health Care Screening: Age-appropriate preventive services recommended by USPTF and ACIP covered by Medicare were discussed today. Services ordered if indicated and agreed upon by the patient.  Referrals offered: Community-based lifestyle interventions to reduce health risks and promote self-management and wellness, fall prevention, nutrition, physical activity, tobacco-use cessation, weight loss, and mental health services as per orders if indicated.    Discussion today about general wellness and lifestyle habits:    · Prevent falls and reduce trip hazards; Cautioned about securing or removing rugs.  · Have a working fire alarm and carbon monoxide detector;   · Engage in regular physical activity and social activities     Follow-up: Return in about 4 months (around 3/26/2019) for routine follow up.

## 2018-11-26 NOTE — PROGRESS NOTES
cc:  Diabetes, wellness    Subjective:     Sveta Knox is a 68 y.o. male presenting with his wife for:     Diabetes, hypertension, hyperlipidemia:  --asa: Does not take an aspirin, delines  --a1c: today is 6.4, was 6.5 on 8/2018.  BGs at home are not checked.  Is currently taking metformin 500mg 3-4 times a day with meals. Denies any hypoglycemic episodes. He continues to try to eat healthy, regular exercise  --BP: History of arterial hypertension, has been stable. Normal today.  Has been normotensive. Denies any chest pain, sob, leg swelling.  Is currently on lisinopril-hctz 20-12.5mg daily.  Last CMP was 8/2018.  --LDL: History of elevated cholesterol, has been stable. Last checked 8/2018.  Is on pravastatin 40mg daily.  No myalgias. He developed left-sided weakness with atorvastatin and simvastatin  --tobacco: Former smoker  --eye exam: last exam 4/2018.  No vision changes, does have a spot in his visual field that is black, has been stable  --foot exam: last exam 7/2018.  Denies any foot, skin problems. He does have some numbness on the top of his left foot, but this has been stable.  --microalbumin: last checked 8/2018.  No urinary symptoms.      Review of systems:  See above.       Current Outpatient Prescriptions:   •  Continuous Blood Gluc Sensor (FREESTYLE LE SENSOR SYSTEM) [facility] [place] TO TEST BLOOD SUGAR DAILY E11.9, Disp: 9 Each, Rfl: 0  •  Blood Glucose Monitoring Suppl Device, Meter: one freestyle le meter. Sig. Use 1x/day for blood sugar monitoring. Dx: e11.9, Disp: 1 Device, Rfl: 0  •  metFORMIN (GLUCOPHAGE) 500 MG Tab, Take 1 Tab by mouth 4 times a day., Disp: 360 Tab, Rfl: 1  •  pravastatin (PRAVACHOL) 40 MG tablet, Take 1 Tab by mouth every day., Disp: 90 Tab, Rfl: 1  •  lisinopril-hydrochlorothiazide (PRINZIDE, ZESTORETIC) 20-12.5 MG per tablet, Take 1 Tab by mouth every day., Disp: 90 Tab, Rfl: 1  •  meloxicam (MOBIC) 15 MG tablet, Take 15 mg by mouth every day., Disp: , Rfl:   •   "fluticasone (FLONASE) 50 MCG/ACT nasal spray, Spray 1 Spray in nose every day., Disp: , Rfl:     Allergies, past medical history, past surgical history, family history, social history reviewed and updated    Objective:     Vitals: /72 (BP Location: Right arm, Patient Position: Sitting)   Pulse 74   Temp 37.1 °C (98.7 °F)   Resp 16   Ht 1.6 m (5' 3\")   Wt 65.8 kg (145 lb)   SpO2 96%   BMI 25.69 kg/m²   General: Alert, pleasant, NAD  HEENT: Normocephalic.  EOMI, no icterus or pallor.  Conjunctivae and lids normal. External ears normal  Heart: Regular rate and rhythm.  S1 and S2 normal.  No murmurs appreciated.  Respiratory: Normal respiratory effort.  Clear to auscultation bilaterally.  Abdomen: Non-distended, soft  Skin: Warm, dry, no rashes.  Musculoskeletal: Gait is normal.  Moves all extremities well.  Extremities: No leg edema.    Psych:  Affect/mood is normal, judgement is good, memory is intact, grooming is appropriate.    Assessment/Plan:     Diagnoses and all orders for this visit:      Type 2 diabetes mellitus without complication, without long-term current use of insulin (HCC)  Stable, well controlled.  Continue current medications.  We discussed starting a baby aspirin, he declined.  Follow-up in 4 months.  -     POCT Hemoglobin A1C    Essential hypertension  Stable, continue lisinopril-hydrochlorothiazide.    Hyperlipidemia, unspecified hyperlipidemia type  Stable, continue pravastatin.      Return in about 4 months (around 3/26/2019) for routine follow up.  "

## 2019-02-19 RX ORDER — LISINOPRIL AND HYDROCHLOROTHIAZIDE 20; 12.5 MG/1; MG/1
1 TABLET ORAL DAILY
Qty: 90 TAB | Refills: 3 | Status: SHIPPED | OUTPATIENT
Start: 2019-02-19 | End: 2020-03-13

## 2019-03-04 ENCOUNTER — OFFICE VISIT (OUTPATIENT)
Dept: MEDICAL GROUP | Facility: MEDICAL CENTER | Age: 69
End: 2019-03-04
Payer: MEDICARE

## 2019-03-04 VITALS
TEMPERATURE: 98.6 F | SYSTOLIC BLOOD PRESSURE: 124 MMHG | OXYGEN SATURATION: 97 % | RESPIRATION RATE: 14 BRPM | HEART RATE: 72 BPM | DIASTOLIC BLOOD PRESSURE: 78 MMHG | HEIGHT: 63 IN | WEIGHT: 142 LBS | BODY MASS INDEX: 25.16 KG/M2

## 2019-03-04 DIAGNOSIS — Z12.11 SCREEN FOR COLON CANCER: ICD-10-CM

## 2019-03-04 DIAGNOSIS — E78.5 HYPERLIPIDEMIA, UNSPECIFIED HYPERLIPIDEMIA TYPE: ICD-10-CM

## 2019-03-04 DIAGNOSIS — M19.041 PRIMARY OSTEOARTHRITIS OF RIGHT HAND: ICD-10-CM

## 2019-03-04 DIAGNOSIS — Z12.5 ENCOUNTER FOR SCREENING FOR MALIGNANT NEOPLASM OF PROSTATE: ICD-10-CM

## 2019-03-04 DIAGNOSIS — E11.9 TYPE 2 DIABETES MELLITUS WITHOUT COMPLICATION, WITHOUT LONG-TERM CURRENT USE OF INSULIN (HCC): ICD-10-CM

## 2019-03-04 DIAGNOSIS — I10 ESSENTIAL HYPERTENSION: ICD-10-CM

## 2019-03-04 LAB
HBA1C MFR BLD: 6.4 % (ref ?–5.8)
INT CON NEG: NORMAL
INT CON POS: NORMAL

## 2019-03-04 PROCEDURE — 99214 OFFICE O/P EST MOD 30 MIN: CPT | Performed by: FAMILY MEDICINE

## 2019-03-04 PROCEDURE — 83036 HEMOGLOBIN GLYCOSYLATED A1C: CPT | Performed by: FAMILY MEDICINE

## 2019-03-04 ASSESSMENT — PATIENT HEALTH QUESTIONNAIRE - PHQ9: CLINICAL INTERPRETATION OF PHQ2 SCORE: 0

## 2019-03-04 NOTE — PROGRESS NOTES
cc:  diabetes    Subjective:     Sveta Knox is a 68 y.o. male presenting with his wife for:      1. Diabetes, hypertension, hyperlipidemia:  --asa: Does not take an aspirin, delines  --a1c: today is 6.4, was 6.4 11/2018.  BGs at home are not checked.  Is currently taking metformin 500mg 3-4 times a day with meals. Denies any hypoglycemic episodes. He continues to try to eat healthy, regular exercise  --BP: History of arterial hypertension, has been stable. Normal today.  Has been normotensive.  Denies any chest pain, sob, leg swelling.  Is currently on lisinopril-hctz 20-12.5mg daily.  Last CMP was 8/2018.  --LDL: History of elevated cholesterol, has been stable. Last checked 8/2018.  Is on pravastatin 40mg daily.  No myalgias. He developed left-sided weakness with atorvastatin and simvastatin  --tobacco: Former smoker  --eye exam: last exam late last year.  No vision changes, does have a spot in his visual field that is black, has been stable  --foot exam: last exam 7/2018.  Denies any foot, skin problems. He does have some numbness on the top of his left foot, but this has been stable.  --microalbumin: last checked 8/2018.  No urinary symptoms.    2. Right thumb pain: Has had right arthritis for many years, seems to be worsening.  he takes meloxicam a couple times a week at most if needed.  Denies any redness, swelling.  Has questions about what the next steps might be for further management.      Review of systems:  See above.       Current Outpatient Prescriptions:   •  Diclofenac Sodium (VOLTAREN) 1 % Gel, Apply 4 g of 1% gel to affected area 4 times daily as needed (maximum: 16 g per joint per day) for pain, Disp: 100 g, Rfl: 3  •  lisinopril-hydrochlorothiazide (PRINZIDE, ZESTORETIC) 20-12.5 MG per tablet, Take 1 Tab by mouth every day., Disp: 90 Tab, Rfl: 3  •  metFORMIN (GLUCOPHAGE) 500 MG Tab, Take 1 Tab by mouth 4 times a day., Disp: 360 Tab, Rfl: 1  •  Continuous Blood Gluc Sensor (FREESTYLE  "LE SENSOR SYSTEM) Misc, USE TO TEST BLOOD SUGAR DAILY E11.9, Disp: 9 Each, Rfl: 0  •  Blood Glucose Monitoring Suppl Device, Meter: one freestyle le meter. Sig. Use 1x/day for blood sugar monitoring. Dx: e11.9, Disp: 1 Device, Rfl: 0  •  pravastatin (PRAVACHOL) 40 MG tablet, Take 1 Tab by mouth every day., Disp: 90 Tab, Rfl: 1  •  meloxicam (MOBIC) 15 MG tablet, Take 15 mg by mouth every day., Disp: , Rfl:   •  fluticasone (FLONASE) 50 MCG/ACT nasal spray, Spray 1 Spray in nose every day., Disp: , Rfl:     Allergies, past medical history, past surgical history, family history, social history reviewed and updated    Objective:     Vitals: /78 (BP Location: Right arm, Patient Position: Sitting)   Pulse 72   Temp 37 °C (98.6 °F)   Resp 14   Ht 1.6 m (5' 3\")   Wt 64.4 kg (142 lb)   SpO2 97%   BMI 25.15 kg/m²    General: Alert, pleasant, NAD  HEENT: Normocephalic.  EOMI, no icterus or pallor.  Conjunctivae and lids normal. External ears normal. Oropharynx non-erythematous, mucous membranes moist.  Neck supple.  No thyromegaly or masses palpated. No cervical or supraclavicular lymphadenopathy.  Heart: Regular rate and rhythm.  S1 and S2 normal.  No murmurs appreciated.  Respiratory: Normal respiratory effort.  Clear to auscultation bilaterally.  Abdomen: Non-distended, soft  Skin: Warm, dry, no rashes.  Musculoskeletal: Gait is normal.  Moves all extremities well.  Extremities: No leg edema.    Psych:  Affect/mood is normal, judgement is good, memory is intact, grooming is appropriate.    Assessment/Plan:     Diagnoses and all orders for this visit:    Type 2 diabetes mellitus without complication, without long-term current use of insulin (HCC)  Stable, well controlled.  Continue metformin.  Follow-up in 4 months.  He will do labs just prior to his next visit  -     POCT Hemoglobin A1C  -     HEMOGLOBIN A1C; Future    Essential hypertension  Stable, continue current medications.  He will do labs just " prior to his next visit  -     Comp Metabolic Panel; Future    Hyperlipidemia, unspecified hyperlipidemia type  Stable, continue current medications.  He will do labs just prior to his next visit  -     Comp Metabolic Panel; Future  -     Lipid Profile; Future    Primary osteoarthritis of right hand  Stable, discussed meloxicam use, referral to Ortho if needed.  We also discussed trying Voltaren gel instead to minimize his meloxicam use  -     Diclofenac Sodium (VOLTAREN) 1 % Gel; Apply 4 g of 1% gel to affected area 4 times daily as needed (maximum: 16 g per joint per day) for pain    Screen for colon cancer  -     OCCULT BLOOD FECES IMMUNOASSAY; Future    Encounter for screening for malignant neoplasm of prostate  -     PROSTATE SPECIFIC AG SCREENING; Future        Return in about 4 months (around 7/4/2019) for Med check.

## 2019-03-07 ENCOUNTER — TELEPHONE (OUTPATIENT)
Dept: MEDICAL GROUP | Facility: MEDICAL CENTER | Age: 69
End: 2019-03-07

## 2019-04-26 ENCOUNTER — HOSPITAL ENCOUNTER (OUTPATIENT)
Facility: MEDICAL CENTER | Age: 69
End: 2019-04-26
Attending: FAMILY MEDICINE
Payer: MEDICARE

## 2019-04-26 PROCEDURE — 82274 ASSAY TEST FOR BLOOD FECAL: CPT

## 2019-04-29 DIAGNOSIS — Z12.11 SCREEN FOR COLON CANCER: ICD-10-CM

## 2019-04-30 LAB — HEMOCCULT STL QL IA: NEGATIVE

## 2019-07-01 ENCOUNTER — HOSPITAL ENCOUNTER (OUTPATIENT)
Dept: LAB | Facility: MEDICAL CENTER | Age: 69
End: 2019-07-01
Attending: FAMILY MEDICINE
Payer: MEDICARE

## 2019-07-01 DIAGNOSIS — E11.9 TYPE 2 DIABETES MELLITUS WITHOUT COMPLICATION, WITHOUT LONG-TERM CURRENT USE OF INSULIN (HCC): ICD-10-CM

## 2019-07-01 DIAGNOSIS — Z12.5 ENCOUNTER FOR SCREENING FOR MALIGNANT NEOPLASM OF PROSTATE: ICD-10-CM

## 2019-07-01 DIAGNOSIS — I10 ESSENTIAL HYPERTENSION: ICD-10-CM

## 2019-07-01 DIAGNOSIS — E78.5 HYPERLIPIDEMIA, UNSPECIFIED HYPERLIPIDEMIA TYPE: ICD-10-CM

## 2019-07-01 LAB
ALBUMIN SERPL BCP-MCNC: 4.3 G/DL (ref 3.2–4.9)
ALBUMIN/GLOB SERPL: 1.5 G/DL
ALP SERPL-CCNC: 42 U/L (ref 30–99)
ALT SERPL-CCNC: 23 U/L (ref 2–50)
ANION GAP SERPL CALC-SCNC: 9 MMOL/L (ref 0–11.9)
AST SERPL-CCNC: 20 U/L (ref 12–45)
BILIRUB SERPL-MCNC: 0.9 MG/DL (ref 0.1–1.5)
BUN SERPL-MCNC: 15 MG/DL (ref 8–22)
CALCIUM SERPL-MCNC: 10.2 MG/DL (ref 8.5–10.5)
CHLORIDE SERPL-SCNC: 104 MMOL/L (ref 96–112)
CHOLEST SERPL-MCNC: 172 MG/DL (ref 100–199)
CO2 SERPL-SCNC: 27 MMOL/L (ref 20–33)
CREAT SERPL-MCNC: 1 MG/DL (ref 0.5–1.4)
EST. AVERAGE GLUCOSE BLD GHB EST-MCNC: 146 MG/DL
FASTING STATUS PATIENT QL REPORTED: NORMAL
GLOBULIN SER CALC-MCNC: 2.9 G/DL (ref 1.9–3.5)
GLUCOSE SERPL-MCNC: 115 MG/DL (ref 65–99)
HBA1C MFR BLD: 6.7 % (ref 0–5.6)
HDLC SERPL-MCNC: 54 MG/DL
LDLC SERPL CALC-MCNC: 106 MG/DL
POTASSIUM SERPL-SCNC: 4.4 MMOL/L (ref 3.6–5.5)
PROT SERPL-MCNC: 7.2 G/DL (ref 6–8.2)
PSA SERPL-MCNC: 1.83 NG/ML (ref 0–4)
SODIUM SERPL-SCNC: 140 MMOL/L (ref 135–145)
TRIGL SERPL-MCNC: 58 MG/DL (ref 0–149)

## 2019-07-01 PROCEDURE — 80053 COMPREHEN METABOLIC PANEL: CPT

## 2019-07-01 PROCEDURE — 80061 LIPID PANEL: CPT

## 2019-07-01 PROCEDURE — 84153 ASSAY OF PSA TOTAL: CPT | Mod: GA

## 2019-07-01 PROCEDURE — 36415 COLL VENOUS BLD VENIPUNCTURE: CPT | Mod: GA

## 2019-07-01 PROCEDURE — 83036 HEMOGLOBIN GLYCOSYLATED A1C: CPT | Mod: GA

## 2019-07-08 ENCOUNTER — OFFICE VISIT (OUTPATIENT)
Dept: MEDICAL GROUP | Facility: MEDICAL CENTER | Age: 69
End: 2019-07-08
Payer: MEDICARE

## 2019-07-08 VITALS
RESPIRATION RATE: 16 BRPM | TEMPERATURE: 98.6 F | DIASTOLIC BLOOD PRESSURE: 74 MMHG | HEART RATE: 74 BPM | HEIGHT: 63 IN | SYSTOLIC BLOOD PRESSURE: 118 MMHG | OXYGEN SATURATION: 95 % | WEIGHT: 145 LBS | BODY MASS INDEX: 25.69 KG/M2

## 2019-07-08 DIAGNOSIS — I10 ESSENTIAL HYPERTENSION: ICD-10-CM

## 2019-07-08 DIAGNOSIS — E11.9 TYPE 2 DIABETES MELLITUS WITHOUT COMPLICATION, WITHOUT LONG-TERM CURRENT USE OF INSULIN (HCC): ICD-10-CM

## 2019-07-08 DIAGNOSIS — E78.5 HYPERLIPIDEMIA, UNSPECIFIED HYPERLIPIDEMIA TYPE: ICD-10-CM

## 2019-07-08 PROCEDURE — 99214 OFFICE O/P EST MOD 30 MIN: CPT | Performed by: FAMILY MEDICINE

## 2019-07-08 NOTE — PROGRESS NOTES
cc:  diabetes    Subjective:     Sveta Knox is a 69 y.o. male presenting with his wife for:       1. Diabetes, hyperlipidemia:  --asa: Does not take an aspirin, delines  --a1c: was 6.7 7/2019.  BGs at home are not checked regularly.  Is currently taking metformin 500mg 3-4 times a day with meals. Denies any hypoglycemic episodes. He has been eating more protein, red meat, desserts.  He plans to work on his diet.  --LDL: History of elevated cholesterol, has been stable. Last checked 7/2019.  Is on pravastatin 40mg daily.  No myalgias. He developed left-sided weakness with atorvastatin and simvastatin  --tobacco: Former smoker  --eye exam: last exam earlier this year.  No vision changes, does have a spot in his visual field that is black, has been stable  --foot exam: last exam 7/2018.  Denies any foot, skin problems. He does have some numbness on the top of his left foot, but this has been stable.  --microalbumin: last checked 7/2019.  No urinary symptoms.    2. Hypertension: has a hx of arterial hypertension, has been stable.  Is currently on lisinopril-hctz 20-12.5mg daily.  Denies any side effects.  Denies any chest pain, shortness of breath, lightheadedness, dizziness, headaches, leg swelling.  · BP today at goal: yes   · BPs at home: not checked  · Sodium intake: discussed   · DASH diet: discussed   · Physical activity: was walking regularly, but not recently   Body mass index is 25.69 kg/m².  · Alcohol use: less than 7 drinks a week   · Tobacco use: nonsmoker  The 10-year ASCVD risk score (Linn JIM Jr., et al., 2013) is: 23.7%    Values used to calculate the score:      Age: 69 years      Sex: Male      Is Non- : No      Diabetic: Yes      Tobacco smoker: No      Systolic Blood Pressure: 118 mmHg      Is BP treated: Yes      HDL Cholesterol: 54 mg/dL  ·     Total Cholesterol: 172 mg/dL    · Is on pravastatin 40mg daily.    · Last CBC: 2014  · Last eGFR: >60 on 7/2019  · Last  "fasting glucose or a1c: 7/2019  · Last lipid panel: 7/2019  · Last microalbumin: 7/2019   · Last EKG: none  · Last echocardiogram: none    Review of systems:  See above.       Current Outpatient Prescriptions:   •  VOLTAREN 1 % Gel, Apply 4 g of 1% gel to affected area 4 times daily as needed (maximum: 16 g per joint per day) for pain, Disp: 100 g, Rfl: 3  •  lisinopril-hydrochlorothiazide (PRINZIDE, ZESTORETIC) 20-12.5 MG per tablet, Take 1 Tab by mouth every day., Disp: 90 Tab, Rfl: 3  •  metFORMIN (GLUCOPHAGE) 500 MG Tab, Take 1 Tab by mouth 4 times a day., Disp: 360 Tab, Rfl: 1  •  Continuous Blood Gluc Sensor (FREESTYLE LE SENSOR SYSTEM) [facility] [place], USE TO TEST BLOOD SUGAR DAILY E11.9, Disp: 9 Each, Rfl: 0  •  Blood Glucose Monitoring Suppl Device, Meter: one freestyle le meter. Sig. Use 1x/day for blood sugar monitoring. Dx: e11.9, Disp: 1 Device, Rfl: 0  •  pravastatin (PRAVACHOL) 40 MG tablet, Take 1 Tab by mouth every day., Disp: 90 Tab, Rfl: 1  •  meloxicam (MOBIC) 15 MG tablet, Take 15 mg by mouth every day., Disp: , Rfl:     Allergies, past medical history, past surgical history, family history, social history reviewed and updated    Objective:     Vitals: /74 (BP Location: Right arm, Patient Position: Sitting)   Pulse 74   Temp 37 °C (98.6 °F) (Temporal)   Resp 16   Ht 1.6 m (5' 3\")   Wt 65.8 kg (145 lb)   SpO2 95%   BMI 25.69 kg/m²   General: Alert, pleasant, NAD  HEENT: Normocephalic.  EOMI, no icterus or pallor.  Conjunctivae and lids normal. External ears normal. Oropharynx non-erythematous, mucous membranes moist.  Neck supple.  No thyromegaly or masses palpated. No cervical or supraclavicular lymphadenopathy.  Heart: Regular rate and rhythm.  S1 and S2 normal.  No murmurs appreciated.  Respiratory: Normal respiratory effort.  Clear to auscultation bilaterally.  Abdomen: Non-distended, soft  Skin: Warm, dry, no rashes.  Musculoskeletal: Gait is normal.  Moves all extremities " well.  Extremities: Distal pulses 2+ symmetric.  No edema; toe nails clean.  Thickened skin at heels.  Normal monofilament.  Achilles reflexes 2+ symmetric.  Psych:  Affect/mood is normal, judgement is good, memory is intact, grooming is appropriate.    Assessment/Plan:     Diagnoses and all orders for this visit:    Type 2 diabetes mellitus without complication, without long-term current use of insulin (HCC)  Stable, continue current medications.  Foot exam was normal today, did discuss foot care for his thickened skin.  We will repeat the following labs prior to his next visit.  Follow-up in 4 months  -     Diabetic Monofilament Lower Extremity Exam  -     CBC WITHOUT DIFFERENTIAL; Future  -     HEMOGLOBIN A1C; Future  -     Basic Metabolic Panel; Future    Essential hypertension  Stable, continue current medications.  Follow-up in 4 months  -     CBC WITHOUT DIFFERENTIAL; Future  -     Basic Metabolic Panel; Future    Hyperlipidemia, unspecified hyperlipidemia type  Stable, continue pravastatin.  He continues to decline a baby aspirin.      Return in about 4 months (around 11/8/2019) for Diabetes (NO RN appt).

## 2020-01-24 ENCOUNTER — HOSPITAL ENCOUNTER (OUTPATIENT)
Dept: LAB | Facility: MEDICAL CENTER | Age: 70
End: 2020-01-24
Attending: FAMILY MEDICINE
Payer: MEDICARE

## 2020-01-24 DIAGNOSIS — E11.9 TYPE 2 DIABETES MELLITUS WITHOUT COMPLICATION, WITHOUT LONG-TERM CURRENT USE OF INSULIN (HCC): ICD-10-CM

## 2020-01-24 DIAGNOSIS — I10 ESSENTIAL HYPERTENSION: ICD-10-CM

## 2020-01-24 LAB
ANION GAP SERPL CALC-SCNC: 11 MMOL/L (ref 0–11.9)
BUN SERPL-MCNC: 16 MG/DL (ref 8–22)
CALCIUM SERPL-MCNC: 10.6 MG/DL (ref 8.5–10.5)
CHLORIDE SERPL-SCNC: 101 MMOL/L (ref 96–112)
CO2 SERPL-SCNC: 26 MMOL/L (ref 20–33)
CREAT SERPL-MCNC: 1.19 MG/DL (ref 0.5–1.4)
EST. AVERAGE GLUCOSE BLD GHB EST-MCNC: 151 MG/DL
GLUCOSE SERPL-MCNC: 142 MG/DL (ref 65–99)
HBA1C MFR BLD: 6.9 % (ref 0–5.6)
POTASSIUM SERPL-SCNC: 4 MMOL/L (ref 3.6–5.5)
SODIUM SERPL-SCNC: 138 MMOL/L (ref 135–145)

## 2020-01-24 PROCEDURE — 83036 HEMOGLOBIN GLYCOSYLATED A1C: CPT | Mod: GA

## 2020-01-24 PROCEDURE — 80048 BASIC METABOLIC PNL TOTAL CA: CPT

## 2020-01-24 PROCEDURE — 85025 COMPLETE CBC W/AUTO DIFF WBC: CPT

## 2020-01-24 PROCEDURE — 36415 COLL VENOUS BLD VENIPUNCTURE: CPT

## 2020-01-25 LAB
BASOPHILS # BLD AUTO: 0.5 % (ref 0–1.8)
BASOPHILS # BLD: 0.03 K/UL (ref 0–0.12)
EOSINOPHIL # BLD AUTO: 0.32 K/UL (ref 0–0.51)
EOSINOPHIL NFR BLD: 5.4 % (ref 0–6.9)
ERYTHROCYTE [DISTWIDTH] IN BLOOD BY AUTOMATED COUNT: 41.8 FL (ref 35.9–50)
HCT VFR BLD AUTO: 46.8 % (ref 42–52)
HGB BLD-MCNC: 16 G/DL (ref 14–18)
IMM GRANULOCYTES # BLD AUTO: 0.02 K/UL (ref 0–0.11)
IMM GRANULOCYTES NFR BLD AUTO: 0.3 % (ref 0–0.9)
LYMPHOCYTES # BLD AUTO: 1.34 K/UL (ref 1–4.8)
LYMPHOCYTES NFR BLD: 22.8 % (ref 22–41)
MCH RBC QN AUTO: 31.6 PG (ref 27–33)
MCHC RBC AUTO-ENTMCNC: 34.2 G/DL (ref 33.7–35.3)
MCV RBC AUTO: 92.5 FL (ref 81.4–97.8)
MONOCYTES # BLD AUTO: 0.34 K/UL (ref 0–0.85)
MONOCYTES NFR BLD AUTO: 5.8 % (ref 0–13.4)
NEUTROPHILS # BLD AUTO: 3.84 K/UL (ref 1.82–7.42)
NEUTROPHILS NFR BLD: 65.2 % (ref 44–72)
NRBC # BLD AUTO: 0 K/UL
NRBC BLD-RTO: 0 /100 WBC
PLATELET # BLD AUTO: 252 K/UL (ref 164–446)
PMV BLD AUTO: 9.8 FL (ref 9–12.9)
RBC # BLD AUTO: 5.06 M/UL (ref 4.7–6.1)
WBC # BLD AUTO: 5.9 K/UL (ref 4.8–10.8)

## 2020-01-26 PROBLEM — E83.52 HYPERCALCEMIA: Status: ACTIVE | Noted: 2020-01-26

## 2020-01-31 ENCOUNTER — OFFICE VISIT (OUTPATIENT)
Dept: MEDICAL GROUP | Facility: MEDICAL CENTER | Age: 70
End: 2020-01-31
Payer: MEDICARE

## 2020-01-31 VITALS
SYSTOLIC BLOOD PRESSURE: 118 MMHG | TEMPERATURE: 97.1 F | OXYGEN SATURATION: 96 % | HEART RATE: 74 BPM | DIASTOLIC BLOOD PRESSURE: 62 MMHG | HEIGHT: 63 IN | BODY MASS INDEX: 26.26 KG/M2 | WEIGHT: 148.2 LBS

## 2020-01-31 DIAGNOSIS — E83.52 HYPERCALCEMIA: ICD-10-CM

## 2020-01-31 DIAGNOSIS — I10 ESSENTIAL HYPERTENSION: ICD-10-CM

## 2020-01-31 DIAGNOSIS — E11.9 TYPE 2 DIABETES MELLITUS WITHOUT COMPLICATION, WITHOUT LONG-TERM CURRENT USE OF INSULIN (HCC): ICD-10-CM

## 2020-01-31 DIAGNOSIS — Z00.00 MEDICARE ANNUAL WELLNESS VISIT, SUBSEQUENT: ICD-10-CM

## 2020-01-31 DIAGNOSIS — E78.5 HYPERLIPIDEMIA, UNSPECIFIED HYPERLIPIDEMIA TYPE: ICD-10-CM

## 2020-01-31 DIAGNOSIS — M19.041 PRIMARY OSTEOARTHRITIS OF RIGHT HAND: ICD-10-CM

## 2020-01-31 PROCEDURE — G0439 PPPS, SUBSEQ VISIT: HCPCS | Performed by: FAMILY MEDICINE

## 2020-01-31 PROCEDURE — 99213 OFFICE O/P EST LOW 20 MIN: CPT | Mod: 25 | Performed by: FAMILY MEDICINE

## 2020-01-31 ASSESSMENT — ENCOUNTER SYMPTOMS: GENERAL WELL-BEING: GOOD

## 2020-01-31 ASSESSMENT — PATIENT HEALTH QUESTIONNAIRE - PHQ9: CLINICAL INTERPRETATION OF PHQ2 SCORE: 0

## 2020-01-31 ASSESSMENT — ACTIVITIES OF DAILY LIVING (ADL): BATHING_REQUIRES_ASSISTANCE: 0

## 2020-01-31 NOTE — PROGRESS NOTES
Cc: diabetes, wellness    HPI:  Sveta Knox is a 69 y.o. here for Medicare Annual Wellness Visit     Patient Active Problem List    Diagnosis Date Noted   • Hypercalcemia 01/26/2020   • Essential hypertension 07/23/2018   • Type 2 diabetes mellitus without complication, without long-term current use of insulin (HCC) 07/23/2018   • Hyperlipidemia 07/23/2018   • Primary osteoarthritis of right hand 07/23/2018       Current Outpatient Medications   Medication Sig Dispense Refill   • metFORMIN (GLUCOPHAGE) 500 MG Tab TAKE 1 TAB BY MOUTH 4 TIMES A DAY. 360 Tab 3   • VOLTAREN 1 % Gel Apply 4 g of 1% gel to affected area 4 times daily as needed (maximum: 16 g per joint per day) for pain 100 g 3   • lisinopril-hydrochlorothiazide (PRINZIDE, ZESTORETIC) 20-12.5 MG per tablet Take 1 Tab by mouth every day. 90 Tab 3   • pravastatin (PRAVACHOL) 40 MG tablet Take 1 Tab by mouth every day. 90 Tab 1   • meloxicam (MOBIC) 15 MG tablet Take 15 mg by mouth every day.       No current facility-administered medications for this visit.             Current supplements as per medication list.       Allergies: Atorvastatin; Bactine; Keflex; and Simvastatin    Current social contact/activities: shooting     He  reports that he has quit smoking. He smoked 0.00 packs per day. He has never used smokeless tobacco. He reports current alcohol use. He reports that he does not use drugs.  Counseling given: Yes      DPA/Advanced Directive:  Patient does not have an Advanced Directive.  A packet and workshop information was given on Advanced Directives.    ROS:    Gait: Uses no assistive device  Ostomy: No  Other tubes: No  Amputations: No  Chronic oxygen use: No  Last eye exam: last year  Wears hearing aids: No   : Denies any urinary leakage during the last 6 months    Screening:    Depression Screening    Little interest or pleasure in doing things?  0 - not at all  Feeling down, depressed , or hopeless? 0 - not at all  Patient Health  Questionnaire Score: 0     If depressive symptoms identified deferred to follow up visit unless specifically addressed in assessment and plan.    Interpretation of PHQ-9 Total Score   Score Severity   1-4 No Depression   5-9 Mild Depression   10-14 Moderate Depression   15-19 Moderately Severe Depression   20-27 Severe Depression    Screening for Cognitive Impairment    Three Minute Recall (village, kitchen, baby) 3/3    Kai clock face with all 12 numbers and set the hands to show 10 past 10.  Yes    Cognitive concerns identified deferred for follow up unless specifically addressed in assessment and plan.    Fall Risk Assessment    Has the patient had two or more falls in the last year or any fall with injury in the last year?  No    Safety Assessment    Throw rugs on floor.  No  Handrails on all stairs.  Yes  Good lighting in all hallways.  Yes  Difficulty hearing.  No  Patient counseled about all safety risks that were identified.    Functional Assessment ADLs    Are there any barriers preventing you from cooking for yourself or meeting nutritional needs?  No.    Are there any barriers preventing you from driving safely or obtaining transportation?  No.    Are there any barriers preventing you from using a telephone or calling for help?  No.    Are there any barriers preventing you from shopping?  No.    Are there any barriers preventing you from taking care of your own finances?  No.    Are there any barriers preventing you from managing your medications?  No.    Are there any barriers preventing you from showering, bathing or dressing yourself?  No.    Are you currently engaging in any exercise or physical activity?  No.     What is your perception of your health?  Good.      Health Maintenance Summary                RETINAL SCREENING Overdue 5/12/1970     URINE ACR / MICROALBUMIN Overdue 8/3/2019      Done 8/3/2018 MICROALBUMIN CREAT RATIO URINE     Patient has more history with this topic...    Annual  "Wellness Visit Overdue 11/27/2019      Done 11/26/2018 Visit Dx: Medicare annual wellness visit, initial    IMM ZOSTER VACCINES Postponed 6/30/2020 Originally 1/16/2020. System: vaccine not available, other system reasons     Done 11/21/2019 Ext Imm: Zoster Shane (Shingrix)    IMM HEP B VACCINE Postponed 11/26/2023 Originally 5/[birth year]/1971. Provider advises postponing for medical reasons    COLON CANCER SCREENING ANNUAL FIT Next Due 4/26/2020      Done 4/26/2019 OCCULT BLOOD FECES IMMUNOASSAY    FASTING LIPID PROFILE Next Due 7/1/2020      Done 7/1/2019 LIPID PROFILE     Patient has more history with this topic...    DIABETES MONOFILAMENT / LE EXAM Next Due 7/8/2020      Done 7/8/2019 AMB DIABETIC MONOFILAMENT LOWER EXTREMITY EXAM     Patient has more history with this topic...    A1C SCREENING Next Due 7/24/2020      Done 1/24/2020 HEMOGLOBIN A1C     Patient has more history with this topic...    SERUM CREATININE Next Due 1/24/2021      Done 1/24/2020 BASIC METABOLIC PANEL     Patient has more history with this topic...    IMM DTaP/Tdap/Td Vaccine Next Due 2/25/2023      Done 2/25/2013 Imm Admin: Tdap Vaccine          Patient Care Team:  Nicko Ferreira M.D. as PCP - General (Family Medicine)  Srini Malone M.D. (Ophthalmology)        Social History     Tobacco Use   • Smoking status: Former Smoker     Packs/day: 0.00   • Smokeless tobacco: Never Used   Substance Use Topics   • Alcohol use: Yes     Comment: about 1 drink/week   • Drug use: No     History reviewed. No pertinent family history.  He  has no past medical history on file.   History reviewed. No pertinent surgical history.    Exam:   /62 (BP Location: Left arm, Patient Position: Sitting, BP Cuff Size: Adult)   Pulse 74   Temp 36.2 °C (97.1 °F) (Temporal)   Ht 1.6 m (5' 3\")   Wt 67.2 kg (148 lb 3.2 oz)   SpO2 96%  Body mass index is 26.25 kg/m².    Hearing fair.    Dentition fair  Alert, oriented in no acute distress.  Eye contact is good, " speech goal directed, affect calm    Assessment and Plan. The following treatment and monitoring plan is recommended:      1. Medicare annual wellness visit, subsequent  - Subsequent Annual Wellness Visit - Includes PPPS ()    2. Type 2 diabetes mellitus without complication, without long-term current use of insulin (HCC)  Stable, continue current medications.    3. Hypercalcemia  New problem, will recheck labs  - Basic Metabolic Panel; Future  - PTH INTACT; Future  - VITAMIN D,25 HYDROXY; Future    4. Essential hypertension  Stable, continue current medications  - Subsequent Annual Wellness Visit - Includes PPPS ()    5. Hyperlipidemia, unspecified hyperlipidemia type  Stable, he will let us know if the pravastatin is giving him side effects  - Subsequent Annual Wellness Visit - Includes PPPS ()    6. Primary osteoarthritis of right hand  Stable, continue Voltaren gel  - Subsequent Annual Wellness Visit - Includes PPPS ()        Services suggested: No services needed at this time  Health Care Screening: Age-appropriate preventive services recommended by USPTF and ACIP covered by Medicare were discussed today. Services ordered if indicated and agreed upon by the patient.  Referrals offered: Community-based lifestyle interventions to reduce health risks and promote self-management and wellness, fall prevention, nutrition, physical activity, tobacco-use cessation, weight loss, and mental health services as per orders if indicated.    Discussion today about general wellness and lifestyle habits:    · Prevent falls and reduce trip hazards; Cautioned about securing or removing rugs.  · Have a working fire alarm and carbon monoxide detector;   · Engage in regular physical activity and social activities     Follow-up: Return in about 6 months (around 7/31/2020) for routine follow up.

## 2020-01-31 NOTE — PROGRESS NOTES
cc:  Diabetes, wellness    Subjective:     Sveta Knox is a 69 y.o. male presenting with his wife for:       1. Diabetes, hyperlipidemia:  --asa: Does not take an aspirin, delines  --a1c: was 6.9 1/2020, was 6.7 7/2019.  BGs at home are not checked regularly.  Is currently taking metformin 500mg 3-4 times a day with meals. Denies any hypoglycemic episodes. Has not been eating as healthy as he used to, but plans to improve  --LDL: History of elevated cholesterol, has been stable. Last checked 7/2019.  Is supposed to be on pravastatin 40mg daily.  He has been having more muscle pain and joint pain several weeks ago.  He stopped his pravastatin last week to see if this was the cause.  He developed left-sided weakness with atorvastatin and simvastatin  --tobacco: Former smoker  --eye exam: last exam in 2019.  No vision changes, does have a spot in his visual field that is black, has been stable  --foot exam: last exam 7/2018.  Denies any foot, skin problems. He does have some numbness on the top of his left foot, but this has been stable.  --microalbumin: last checked 8/2018.  No urinary symptoms.    2. Hypertension: has a hx of arterial hypertension, has been stable.  Is currently on lisinopril-hctz 20-12.5mg daily.  Denies any side effects.  Denies any chest pain, shortness of breath, lightheadedness, dizziness, headaches, leg swelling.  · BP today at goal: yes   · BPs at home: not checked  · Sodium intake: discussed   · DASH diet: discussed   · Physical activity: was walking regularly, but not recently   Body mass index is 26.25 kg/m².  · Alcohol use: less than 7 drinks a week   · Tobacco use: nonsmoker  The 10-year ASCVD risk score ([place] FERNANDEZ Jr., et al., 2013) is: 23.7%    Values used to calculate the score:      Age: 69 years      Sex: Male      Is Non- : No      Diabetic: Yes      Tobacco smoker: No      Systolic Blood Pressure: 118 mmHg      Is BP treated: Yes      HDL  "Cholesterol: 54 mg/dL  ·     Total Cholesterol: 172 mg/dL    · Is on pravastatin 40mg daily.    · Last CBC: 1/2020  · Last eGFR: >60 on 1/2020  · Last fasting glucose or a1c: 1/2020  · Last lipid panel: 7/2019  · Last microalbumin: 7/2019   · Last EKG: none  · Last echocardiogram: none      Review of systems:  See above.       Current Outpatient Medications:   •  metFORMIN (GLUCOPHAGE) 500 MG Tab, TAKE 1 TAB BY MOUTH 4 TIMES A DAY., Disp: 360 Tab, Rfl: 3  •  VOLTAREN 1 % Gel, Apply 4 g of 1% gel to affected area 4 times daily as needed (maximum: 16 g per joint per day) for pain, Disp: 100 g, Rfl: 3  •  lisinopril-hydrochlorothiazide (PRINZIDE, ZESTORETIC) 20-12.5 MG per tablet, Take 1 Tab by mouth every day., Disp: 90 Tab, Rfl: 3  •  pravastatin (PRAVACHOL) 40 MG tablet, Take 1 Tab by mouth every day., Disp: 90 Tab, Rfl: 1  •  meloxicam (MOBIC) 15 MG tablet, Take 15 mg by mouth every day., Disp: , Rfl:     Allergies, past medical history, past surgical history, family history, social history reviewed and updated    Objective:     Vitals: /62 (BP Location: Left arm, Patient Position: Sitting, BP Cuff Size: Adult)   Pulse 74   Temp 36.2 °C (97.1 °F) (Temporal)   Ht 1.6 m (5' 3\")   Wt 67.2 kg (148 lb 3.2 oz)   SpO2 96%   BMI 26.25 kg/m²   General: Alert, pleasant, NAD  HEENT: Normocephalic.    Heart: Regular rate and rhythm.  S1 and S2 normal.  No murmurs appreciated.  Respiratory: Normal respiratory effort.  Clear to auscultation bilaterally.  Abdomen: Non-distended, soft  Skin: Warm, dry, no rashes.  Musculoskeletal: Gait is normal.  Moves all extremities well.  Extremities: No leg edema.  Psych:  Affect/mood is normal, judgement is good, memory is intact, grooming is appropriate.    Assessment/Plan:     Diagnoses and all orders for this visit:    Type 2 diabetes mellitus without complication, without long-term current use of insulin (HCC)  Stable, continue current medications.  Follow-up in 6 " months    Hypercalcemia  New problem.  We will recheck labs.  -     Basic Metabolic Panel; Future  -     PTH INTACT; Future  -     VITAMIN D,25 HYDROXY; Future        Return in about 6 months (around 7/31/2020) for routine follow up.

## 2020-03-06 ENCOUNTER — HOSPITAL ENCOUNTER (OUTPATIENT)
Dept: LAB | Facility: MEDICAL CENTER | Age: 70
End: 2020-03-06
Attending: FAMILY MEDICINE
Payer: MEDICARE

## 2020-03-06 DIAGNOSIS — E83.52 HYPERCALCEMIA: ICD-10-CM

## 2020-03-06 LAB
25(OH)D3 SERPL-MCNC: 43 NG/ML (ref 30–100)
ANION GAP SERPL CALC-SCNC: 10 MMOL/L (ref 0–11.9)
BUN SERPL-MCNC: 15 MG/DL (ref 8–22)
CALCIUM SERPL-MCNC: 9.9 MG/DL (ref 8.5–10.5)
CHLORIDE SERPL-SCNC: 103 MMOL/L (ref 96–112)
CO2 SERPL-SCNC: 28 MMOL/L (ref 20–33)
CREAT SERPL-MCNC: 1.25 MG/DL (ref 0.5–1.4)
GLUCOSE SERPL-MCNC: 137 MG/DL (ref 65–99)
POTASSIUM SERPL-SCNC: 4.4 MMOL/L (ref 3.6–5.5)
PTH-INTACT SERPL-MCNC: 24.8 PG/ML (ref 14–72)
SODIUM SERPL-SCNC: 141 MMOL/L (ref 135–145)

## 2020-03-06 PROCEDURE — 82306 VITAMIN D 25 HYDROXY: CPT

## 2020-03-06 PROCEDURE — 83970 ASSAY OF PARATHORMONE: CPT

## 2020-03-06 PROCEDURE — 36415 COLL VENOUS BLD VENIPUNCTURE: CPT

## 2020-03-06 PROCEDURE — 80048 BASIC METABOLIC PNL TOTAL CA: CPT

## 2020-03-07 PROBLEM — E83.52 HYPERCALCEMIA: Status: RESOLVED | Noted: 2020-01-26 | Resolved: 2020-03-07

## 2020-03-13 RX ORDER — LISINOPRIL AND HYDROCHLOROTHIAZIDE 20; 12.5 MG/1; MG/1
1 TABLET ORAL DAILY
Qty: 90 TAB | Refills: 3 | Status: SHIPPED | OUTPATIENT
Start: 2020-03-13 | End: 2021-02-25

## 2020-06-25 ENCOUNTER — TELEPHONE (OUTPATIENT)
Dept: MEDICAL GROUP | Facility: MEDICAL CENTER | Age: 70
End: 2020-06-25

## 2020-07-06 ENCOUNTER — OFFICE VISIT (OUTPATIENT)
Dept: MEDICAL GROUP | Facility: MEDICAL CENTER | Age: 70
End: 2020-07-06
Payer: MEDICARE

## 2020-07-06 VITALS
TEMPERATURE: 96.9 F | BODY MASS INDEX: 25.52 KG/M2 | RESPIRATION RATE: 16 BRPM | HEART RATE: 74 BPM | HEIGHT: 63 IN | WEIGHT: 144 LBS | DIASTOLIC BLOOD PRESSURE: 78 MMHG | SYSTOLIC BLOOD PRESSURE: 128 MMHG | OXYGEN SATURATION: 96 %

## 2020-07-06 DIAGNOSIS — E78.5 HYPERLIPIDEMIA, UNSPECIFIED HYPERLIPIDEMIA TYPE: ICD-10-CM

## 2020-07-06 DIAGNOSIS — Z12.5 ENCOUNTER FOR SCREENING FOR MALIGNANT NEOPLASM OF PROSTATE: ICD-10-CM

## 2020-07-06 DIAGNOSIS — E11.9 TYPE 2 DIABETES MELLITUS WITHOUT COMPLICATION, WITHOUT LONG-TERM CURRENT USE OF INSULIN (HCC): ICD-10-CM

## 2020-07-06 DIAGNOSIS — I10 ESSENTIAL HYPERTENSION: ICD-10-CM

## 2020-07-06 LAB
HBA1C MFR BLD: 6.5 % (ref 0–5.6)
INT CON NEG: NEGATIVE
INT CON POS: POSITIVE

## 2020-07-06 PROCEDURE — 83036 HEMOGLOBIN GLYCOSYLATED A1C: CPT | Performed by: FAMILY MEDICINE

## 2020-07-06 PROCEDURE — 99214 OFFICE O/P EST MOD 30 MIN: CPT | Performed by: FAMILY MEDICINE

## 2020-07-06 RX ORDER — PRAVASTATIN SODIUM 40 MG
40 TABLET ORAL DAILY
Qty: 90 TAB | Refills: 3 | Status: SHIPPED | OUTPATIENT
Start: 2020-07-06 | End: 2021-07-01

## 2020-07-06 ASSESSMENT — FIBROSIS 4 INDEX: FIB4 SCORE: 1.13

## 2020-07-06 NOTE — PROGRESS NOTES
cc:  Diabetes    Subjective:     Sveta Knox is a 69 y.o. male presenting with his wife for:       1. Diabetes, hyperlipidemia:  --asa: Does not take an aspirin, delines  --a1c: today is 6.5, was 6.9 1/2020.  BGs at home are not checked regularly.  Is currently taking metformin 500mg 3-4 times a day with meals. Denies any hypoglycemic episodes.  He has cut down on his carbohydrate intake.  --LDL: History of elevated cholesterol, has been stable. Last checked 7/2019.  Is supposed to be on pravastatin 40mg daily, would like to restart it.  He had been having more muscle pain and joint pain, which he thought was due to pravastatin.  However, his symptoms did not improve.  --tobacco: Former smoker  --eye exam: last exam in 2019.  No vision changes, does have a spot in his right visual field that is black, has been stable.  Has an appt next week  --foot exam: last exam 7/2018.  Denies any foot, skin problems. He does have some numbness on the top of his left foot, but this has been stable.  --microalbumin: last checked 8/2018.  No urinary symptoms.    2. Hypertension: has a hx of arterial hypertension, has been stable.  Is currently on lisinopril-hctz 20-12.5mg daily.  Denies any side effects.  Denies any chest pain, shortness of breath, lightheadedness, dizziness, headaches, leg swelling.  · BP today at goal: yes   · BPs at home: Normal at home.  He once checked his blood pressure off his medication, and it was quite elevated.  · Sodium intake: discussed   · DASH diet: discussed   · Physical activity: was walking regularly, physically active  Body mass index is 25.51 kg/m².  · Alcohol use: less than 7 drinks a week   · Tobacco use: nonsmoker  The 10-year ASCVD risk score ([place] JIM Jr., et al., 2013) is: 28.9%    Values used to calculate the score:      Age: 70 years      Sex: Male      Is Non- : No      Diabetic: Yes      Tobacco smoker: No      Systolic Blood Pressure: 128 mmHg      Is  "BP treated: Yes      HDL Cholesterol: 54 mg/dL  ·     Total Cholesterol: 172 mg/dL    · Is on pravastatin 40mg daily.    · Last CBC: 1/2020  · Last eGFR: 57 on 3/2020  · Last fasting glucose or a1c: today  · Last lipid panel: 7/2019  · Last microalbumin: 8/2018  · Last EKG: none  · Last echocardiogram: none    Review of systems:  See above.       Current Outpatient Medications:   •  pravastatin (PRAVACHOL) 40 MG tablet, Take 1 Tab by mouth every day., Disp: 90 Tab, Rfl: 3  •  lisinopril-hydrochlorothiazide (PRINZIDE) 20-12.5 MG per tablet, Take 1 Tab by mouth every day., Disp: 90 Tab, Rfl: 3  •  metFORMIN (GLUCOPHAGE) 500 MG Tab, TAKE 1 TAB BY MOUTH 4 TIMES A DAY., Disp: 360 Tab, Rfl: 3  •  VOLTAREN 1 % Gel, Apply 4 g of 1% gel to affected area 4 times daily as needed (maximum: 16 g per joint per day) for pain, Disp: 100 g, Rfl: 3  •  meloxicam (MOBIC) 15 MG tablet, Take 15 mg by mouth every day., Disp: , Rfl:     Allergies, past medical history, past surgical history, family history, social history reviewed and updated    Objective:     Vitals: /78   Pulse 74   Temp 36.1 °C (96.9 °F)   Resp 16   Ht 1.6 m (5' 3\")   Wt 65.3 kg (144 lb)   SpO2 96%   BMI 25.51 kg/m²   General: Alert, pleasant, NAD  HEENT: Normocephalic.  EOMI, no icterus or pallor.  Conjunctivae and lids normal. External ears normal. Oropharynx non-erythematous, mucous membranes moist.  Neck supple.  No thyromegaly or masses palpated. No cervical or supraclavicular lymphadenopathy.  Heart: Regular rate and rhythm.  S1 and S2 normal.  No murmurs appreciated.  Respiratory: Normal respiratory effort.  Clear to auscultation bilaterally.  Abdomen: Non-distended, soft  Skin: Warm, dry, no rashes.  Musculoskeletal: Gait is normal.  Moves all extremities well.  Extremities: No leg edema.  Psych:  Affect/mood is normal, judgement is good, memory is intact, grooming is appropriate.    Assessment/Plan:     Sveta was seen today for " follow-up.    Diagnoses and all orders for this visit:    Type 2 diabetes mellitus without complication, without long-term current use of insulin (HCC)  Stable, well controlled.  Continue metformin.  -     POCT Hemoglobin A1C  -     Basic Metabolic Panel; Future  -     Lipid Profile; Future  -     MICROALBUMIN CREAT RATIO URINE; Future    Essential hypertension  Stable, well controlled.  Continue lisinopril-hydrochlorothiazide  -     Basic Metabolic Panel; Future  -     Lipid Profile; Future    Hyperlipidemia, unspecified hyperlipidemia type  Stable, will resume pravastatin  -     Lipid Profile; Future  -     pravastatin (PRAVACHOL) 40 MG tablet; Take 1 Tab by mouth every day.    Encounter for screening for malignant neoplasm of prostate  -     PROSTATE SPECIFIC AG SCREENING; Future        Return in about 6 months (around 1/6/2021) for routine follow up.

## 2020-07-16 ENCOUNTER — HOSPITAL ENCOUNTER (OUTPATIENT)
Dept: LAB | Facility: MEDICAL CENTER | Age: 70
End: 2020-07-16
Attending: FAMILY MEDICINE
Payer: MEDICARE

## 2020-07-16 DIAGNOSIS — E11.9 TYPE 2 DIABETES MELLITUS WITHOUT COMPLICATION, WITHOUT LONG-TERM CURRENT USE OF INSULIN (HCC): ICD-10-CM

## 2020-07-16 DIAGNOSIS — E78.5 HYPERLIPIDEMIA, UNSPECIFIED HYPERLIPIDEMIA TYPE: ICD-10-CM

## 2020-07-16 DIAGNOSIS — I10 ESSENTIAL HYPERTENSION: ICD-10-CM

## 2020-07-16 DIAGNOSIS — Z12.5 ENCOUNTER FOR SCREENING FOR MALIGNANT NEOPLASM OF PROSTATE: ICD-10-CM

## 2020-07-16 LAB
ANION GAP SERPL CALC-SCNC: 15 MMOL/L (ref 7–16)
BUN SERPL-MCNC: 8 MG/DL (ref 8–22)
CALCIUM SERPL-MCNC: 9.3 MG/DL (ref 8.5–10.5)
CHLORIDE SERPL-SCNC: 98 MMOL/L (ref 96–112)
CHOLEST SERPL-MCNC: 193 MG/DL (ref 100–199)
CO2 SERPL-SCNC: 23 MMOL/L (ref 20–33)
CREAT SERPL-MCNC: 0.89 MG/DL (ref 0.5–1.4)
CREAT UR-MCNC: 37.57 MG/DL
FASTING STATUS PATIENT QL REPORTED: NORMAL
GLUCOSE SERPL-MCNC: 97 MG/DL (ref 65–99)
HDLC SERPL-MCNC: 61 MG/DL
LDLC SERPL CALC-MCNC: 116 MG/DL
MICROALBUMIN UR-MCNC: <1.2 MG/DL
MICROALBUMIN/CREAT UR: NORMAL MG/G (ref 0–30)
POTASSIUM SERPL-SCNC: 3.7 MMOL/L (ref 3.6–5.5)
PSA SERPL-MCNC: 2.65 NG/ML (ref 0–4)
SODIUM SERPL-SCNC: 136 MMOL/L (ref 135–145)
TRIGL SERPL-MCNC: 82 MG/DL (ref 0–149)

## 2020-07-16 PROCEDURE — 80048 BASIC METABOLIC PNL TOTAL CA: CPT

## 2020-07-16 PROCEDURE — 82043 UR ALBUMIN QUANTITATIVE: CPT

## 2020-07-16 PROCEDURE — 80061 LIPID PANEL: CPT

## 2020-07-16 PROCEDURE — 82570 ASSAY OF URINE CREATININE: CPT

## 2020-07-16 PROCEDURE — 36415 COLL VENOUS BLD VENIPUNCTURE: CPT

## 2020-07-16 PROCEDURE — 84153 ASSAY OF PSA TOTAL: CPT

## 2020-12-31 ENCOUNTER — TELEPHONE (OUTPATIENT)
Dept: MEDICAL GROUP | Facility: MEDICAL CENTER | Age: 70
End: 2020-12-31

## 2021-01-11 ENCOUNTER — OFFICE VISIT (OUTPATIENT)
Dept: MEDICAL GROUP | Facility: MEDICAL CENTER | Age: 71
End: 2021-01-11
Payer: MEDICARE

## 2021-01-11 VITALS
WEIGHT: 145 LBS | SYSTOLIC BLOOD PRESSURE: 122 MMHG | DIASTOLIC BLOOD PRESSURE: 70 MMHG | RESPIRATION RATE: 16 BRPM | BODY MASS INDEX: 25.69 KG/M2 | TEMPERATURE: 97.6 F | OXYGEN SATURATION: 96 % | HEIGHT: 63 IN | HEART RATE: 74 BPM

## 2021-01-11 DIAGNOSIS — K04.7 TOOTH INFECTION: ICD-10-CM

## 2021-01-11 DIAGNOSIS — E78.5 HYPERLIPIDEMIA, UNSPECIFIED HYPERLIPIDEMIA TYPE: ICD-10-CM

## 2021-01-11 DIAGNOSIS — I10 ESSENTIAL HYPERTENSION: ICD-10-CM

## 2021-01-11 DIAGNOSIS — E11.9 TYPE 2 DIABETES MELLITUS WITHOUT COMPLICATION, WITHOUT LONG-TERM CURRENT USE OF INSULIN (HCC): ICD-10-CM

## 2021-01-11 LAB
HBA1C MFR BLD: 6.8 % (ref 0–5.6)
INT CON NEG: NEGATIVE
INT CON POS: POSITIVE

## 2021-01-11 PROCEDURE — 99214 OFFICE O/P EST MOD 30 MIN: CPT | Performed by: FAMILY MEDICINE

## 2021-01-11 PROCEDURE — 83036 HEMOGLOBIN GLYCOSYLATED A1C: CPT | Performed by: FAMILY MEDICINE

## 2021-01-11 RX ORDER — AMOXICILLIN AND CLAVULANATE POTASSIUM 875; 125 MG/1; MG/1
1 TABLET, FILM COATED ORAL 2 TIMES DAILY
Qty: 14 TAB | Refills: 0 | Status: SHIPPED | OUTPATIENT
Start: 2021-01-11 | End: 2021-01-18

## 2021-01-11 ASSESSMENT — PATIENT HEALTH QUESTIONNAIRE - PHQ9: CLINICAL INTERPRETATION OF PHQ2 SCORE: 0

## 2021-01-11 ASSESSMENT — FIBROSIS 4 INDEX: FIB4 SCORE: 1.13

## 2021-01-11 NOTE — PROGRESS NOTES
"cc: Tooth infection    Subjective:     Sveta Knox is a 70 y.o. male presenting with his wife with a possible tooth infection.  Started about a week ago.  Has noted swelling, some discomfort, pus/discharge from one of his teeth.  He has been taking meloxicam, which seems to help.  He did have a dental visit recently, but his symptoms started the day after.  Denies any fevers, bleeding, any other issues.  Diabetes remains well controlled.  A1c today 6.8%.  Blood pressure is normal.      Review of systems:  See above.       Current Outpatient Medications:   •  amoxicillin-clavulanate (AUGMENTIN) 875-125 MG Tab, Take 1 Tab by mouth 2 times a day for 7 days., Disp: 14 Tab, Rfl: 0  •  metFORMIN (GLUCOPHAGE) 500 MG Tab, Take 1 Tab by mouth 4 times a day., Disp: 360 Tab, Rfl: 3  •  VOLTAREN 1 % Gel, Apply 4 g of 1% gel to affected area 4 times daily as needed (maximum: 16 g per joint per day) for pain, Disp: 100 g, Rfl: 3  •  pravastatin (PRAVACHOL) 40 MG tablet, Take 1 Tab by mouth every day., Disp: 90 Tab, Rfl: 3  •  lisinopril-hydrochlorothiazide (PRINZIDE) 20-12.5 MG per tablet, Take 1 Tab by mouth every day., Disp: 90 Tab, Rfl: 3  •  meloxicam (MOBIC) 15 MG tablet, Take 15 mg by mouth every day., Disp: , Rfl:     Allergies, past medical history, past surgical history, family history, social history reviewed and updated    Objective:     Vitals: /70   Pulse 74   Temp 36.4 °C (97.6 °F)   Resp 16   Ht 1.6 m (5' 3\")   Wt 65.8 kg (145 lb)   SpO2 96%   BMI 25.69 kg/m²   General: Alert, pleasant, NAD  HEENT: Normocephalic.   Heart: Regular rate and rhythm.  S1 and S2 normal.  No murmurs appreciated.  Respiratory: Normal respiratory effort.  Clear to auscultation bilaterally.  Abdomen: Non-distended, soft  Skin: Warm, dry, no rashes.  Musculoskeletal: Gait is normal.  Moves all extremities well.  Extremities:Distal pulses 2+ symmetric.  No edema, skin lesions; toe nails clean. Dry skin at heels.  Normal " monofilament.  Achilles reflexes 2+ symmetric.  Psych:  Affect/mood is normal, judgement is good, memory is intact, grooming is appropriate.    Assessment/Plan:     Sveta was seen today for follow-up.    Diagnoses and all orders for this visit:    Tooth infection  New problem, will start a course of Augmentin.  If no improvement, recommended that he follow-up with his dentist  -     amoxicillin-clavulanate (AUGMENTIN) 875-125 MG Tab; Take 1 Tab by mouth 2 times a day for 7 days.    Type 2 diabetes mellitus without complication, without long-term current use of insulin (HCC)  Stable, continue Metformin  -     POCT Hemoglobin A1C  -     Diabetic Monofilament Lower Extremity Exam    Essential hypertension  Stable, continue lisinopril-hydrochlorothiazide    Hyperlipidemia, unspecified hyperlipidemia type  Stable, continue pravastatin        Return in about 27 weeks (around 7/19/2021) for routine follow up.

## 2021-02-25 RX ORDER — LISINOPRIL AND HYDROCHLOROTHIAZIDE 20; 12.5 MG/1; MG/1
1 TABLET ORAL DAILY
Qty: 90 TABLET | Refills: 3 | Status: SHIPPED | OUTPATIENT
Start: 2021-02-25 | End: 2022-02-08

## 2021-03-03 DIAGNOSIS — Z23 NEED FOR VACCINATION: ICD-10-CM

## 2021-03-11 ENCOUNTER — PATIENT MESSAGE (OUTPATIENT)
Dept: MEDICAL GROUP | Facility: MEDICAL CENTER | Age: 71
End: 2021-03-11

## 2021-03-11 DIAGNOSIS — M19.041 PRIMARY OSTEOARTHRITIS OF RIGHT HAND: ICD-10-CM

## 2021-03-31 ENCOUNTER — OCCUPATIONAL THERAPY (OUTPATIENT)
Dept: OCCUPATIONAL THERAPY | Facility: REHABILITATION | Age: 71
End: 2021-03-31
Attending: FAMILY MEDICINE
Payer: MEDICARE

## 2021-03-31 DIAGNOSIS — M19.041 PRIMARY OSTEOARTHRITIS OF RIGHT HAND: ICD-10-CM

## 2021-03-31 PROCEDURE — 97165 OT EVAL LOW COMPLEX 30 MIN: CPT

## 2021-03-31 PROCEDURE — 97110 THERAPEUTIC EXERCISES: CPT

## 2021-03-31 PROCEDURE — 97760 ORTHOTIC MGMT&TRAING 1ST ENC: CPT

## 2021-03-31 SDOH — ECONOMIC STABILITY: GENERAL: QUALITY OF LIFE: EXCELLENT

## 2021-03-31 ASSESSMENT — ENCOUNTER SYMPTOMS
PAIN SCALE: 2
ALLEVIATING FACTORS: PAIN MEDICATION
PAIN SCALE AT HIGHEST: 7
QUALITY: DULL ACHE
QUALITY: STABBING
PAIN SCALE AT LOWEST: 1
EXACERBATED BY: ACTIVITY
ALLEVIATING FACTORS: MASSAGE
PAIN TIMING: ALL DAY
PAIN TIMING: WHEN ACTIVE
QUALITY: SHARP

## 2021-03-31 NOTE — OP THERAPY EVALUATION
"  Outpatient Occupational Therapy  INITIAL EVALUATION    [facility] Occupational Therapy [address]  [address]  [place] [address]  Phone:  [phone]  Fax:  [phone]    Date of Evaluation: 2021    Patient: Sveta Knox  YOB: 1950  MRN: [id number]     Referring Provider: Nicko Ferreira M.D.  [address] [place] [address]  [place] [id number]   Referring Diagnosis Primary osteoarthritis of right hand [M19.041]     Time Calculation    Start time: 830  Stop time: 930 Time Calculation (min): 60 minutes             Chief Complaint: Hand Problem    Visit Diagnoses     ICD-10-CM   1. Primary osteoarthritis of right hand  M19.041       Subjective:   History of Present Illness:     Date of onset:  3/31/2001    Mechanism of injury:  Patient stated that he was told by a sports MD that he had arthritis in hands and he has noticed it has gotten progressively worse.  Taking anti-inflammatory medication when things are \"too painful\".    Quality of life:  Excellent  Prior level of function:  Independent with all ADLs; however when pain in hands are bad and affects quality of funciton  Headaches:  no headaches  Ear problems: none  Sleep disturbance:  Uses sleep aids to fall asleep  Pain:     Current pain ratin    At best pain ratin    At worst pain ratin    Location:  B hands, R worse than left.     Quality:  Sharp, stabbing and dull ache    Pain timing:  All day and when active    Relieving factors:  Massage and pain medication    Aggravating factors:  Activity    Progression:  Worsening    Pain Comments::  Pain primarily at CMC joints of both hands.  Patient also has peripheral neuropathy in B hands and feet due to DM.   Social Support:     Lives in:  One-story house    Lives with:  Spouse  Hand dominance:  Right  Diagnostic Tests:     None    Treatments:     Previous treatment:  Massage    Current treatment:  Massage and rest    Treatment Comments:  Patient seen " therapy for hand issues.   Activities of Daily Living:     Patient reported ADL status: Independent with ADLs; however increased difficulty when pain increasing in hands   Patient Goals:     Patient goals for therapy:  Decreased pain      No past medical history on file.  No past surgical history on file.  Social History     Tobacco Use   • Smoking status: Former Smoker     Packs/day: 0.00   • Smokeless tobacco: Never Used   Substance Use Topics   • Alcohol use: Yes     Comment: about 1 drink/week     Family and Occupational History     Socioeconomic History   • Marital status:      Spouse name: Not on file   • Number of children: Not on file   • Years of education: Not on file   • Highest education level: Not on file   Occupational History   • Not on file       Objective     Active Range of Motion     Left Thumb     Normal active range of motion    Right Thumb     Normal active range of motion    Left Digits    Normal active range of motion    Right Digits   Normal active range of motion    Strength:      Left Wrist/Hand    (2nd hand position)     Trial 1: 70    Trial 2: 71    Trial 3: 78    Average: 73    Thumb Strength  Key/Lateral Pinch     Trial 1: 23  Tip/Two-Point Pinch     Trial 1: 15  Palmar/Three-Point Pinch     Trial 1: 20    Right Wrist/Hand    (2nd hand position)     Trial 1: 74    Trial 2: 81    Trial 3: 82    Average: 79    Thumb Strength   Key/Lateral Pinch     Trial 1: 19  Tip/Two-Point Pinch     Trial 1: 21  Palmar/Three-Point Pinch     Trial 1: 19        Therapeutic Exercises (CPT 31804):     1. AROM/AAROM of thumb, 3 x day    2. Theraputty, 3 x day    Therapeutic Treatments and Modalities:    1. Manual Therapy (CPT 40397), CMC joints of B thumbs      Time-based treatments/modalities:    Occupational Therapy Timed Treatment Charges  Orthotics training initial, minutes (CPT 65713): 15 minutes  Therapeutic exercise minutes (CPT 83701): 15 minutes      Assessment, Response and Plan:    Impairments: impaired physical strength and pain with function    Assessment details:  Patient is a 70 y/o male with PMHX of DM, HTN and OA of B  hands.  Patient presenting with decreased thumb web space of R hand, decreased B grasp and pinch strength.  Pain after resistive domestic and leisure tasks.      Barriers to therapy:  None  Prognosis: good    Prognosis details:  Patient motivated to work on HEP to enhance hand function and open to learning more regarding joint protection techniques  Goals:   Short Term Goals:   1.  Patient will return demonstrate HEP with supervision and min verbal cues  2.  Patient will enhance R  strength to 82 pounds to improve quality of leisure and work tasks.  3.  Patient will improve R lat pinch strength to 25 pounds to enhance fine motor ADLs.  4.  OTR with introduce/educate patient on use of orthosis and joint protection techniques to minimize level of pain in CMC joint during functional tasks.  Short term goal timespan:  2-4 weeks    Long Term Goals:    1.  Patient will return demonstrate HEP with independence  2.  Patient will enhance R  strength to 85 pounds to improve quality of leisure and work tasks.  3.  Patient will improve R lat pinch strength to 27 pounds to enhance fine motor ADLs.  4.  Patient will be able to return demonstrate knowledge on use of orthosis and joint protection techniques to minimize level of pain in CMC joint during functional tasks from a 7 to a 4/10  Long term goal timespan:  4-6 weeks    Plan:   Therapy options:  Occupational therapy treatment to continue  Planned therapy interventions:  Orthotic Training (CPT 16902), Therapeutic Activities (CPT 89904) and Therapeutic Exercise (CPT 90427)  Frequency:  1x week  Duration in weeks:  6  Duration in visits:  6  Discussed with:  Patient      Functional Assessment Used: UEFI=68/80        Referring provider co-signature:  I have reviewed this plan of care and my co-signature certifies the need for  services.    Certification Period: 03/31/2021 to  05/26/21    Physician Signature: ________________________________ Date: ______________

## 2021-07-01 RX ORDER — PRAVASTATIN SODIUM 40 MG
40 TABLET ORAL
Qty: 90 TABLET | Refills: 3 | Status: SHIPPED | OUTPATIENT
Start: 2021-07-01 | End: 2022-06-20

## 2021-07-26 ENCOUNTER — OFFICE VISIT (OUTPATIENT)
Dept: MEDICAL GROUP | Facility: MEDICAL CENTER | Age: 71
End: 2021-07-26
Payer: MEDICARE

## 2021-07-26 VITALS
DIASTOLIC BLOOD PRESSURE: 68 MMHG | OXYGEN SATURATION: 97 % | BODY MASS INDEX: 25.16 KG/M2 | WEIGHT: 142 LBS | HEIGHT: 63 IN | SYSTOLIC BLOOD PRESSURE: 110 MMHG | RESPIRATION RATE: 16 BRPM | TEMPERATURE: 97.6 F | HEART RATE: 68 BPM

## 2021-07-26 DIAGNOSIS — Z12.5 ENCOUNTER FOR SCREENING FOR MALIGNANT NEOPLASM OF PROSTATE: ICD-10-CM

## 2021-07-26 DIAGNOSIS — I10 ESSENTIAL HYPERTENSION: ICD-10-CM

## 2021-07-26 DIAGNOSIS — E78.5 HYPERLIPIDEMIA, UNSPECIFIED HYPERLIPIDEMIA TYPE: ICD-10-CM

## 2021-07-26 DIAGNOSIS — E11.9 TYPE 2 DIABETES MELLITUS WITHOUT COMPLICATION, WITHOUT LONG-TERM CURRENT USE OF INSULIN (HCC): ICD-10-CM

## 2021-07-26 LAB
HBA1C MFR BLD: 6.9 % (ref 0–5.6)
INT CON NEG: NEGATIVE
INT CON POS: POSITIVE

## 2021-07-26 PROCEDURE — 83036 HEMOGLOBIN GLYCOSYLATED A1C: CPT | Performed by: FAMILY MEDICINE

## 2021-07-26 PROCEDURE — 99214 OFFICE O/P EST MOD 30 MIN: CPT | Performed by: FAMILY MEDICINE

## 2021-07-26 NOTE — PROGRESS NOTES
"  Subjective:     Sveta Knox is a 71 y.o. male presenting with his wife for a follow-up.  He continues on his Metformin regularly.  Has noticed his blood sugars have been more elevated in the evenings.  He has cut down on his alcohol intake.  Blood pressures remain stable.  Overall feels well.        Current Outpatient Medications:   •  pravastatin (PRAVACHOL) 40 MG tablet, Take 1 tablet by mouth every day., Disp: 90 tablet, Rfl: 3  •  lisinopril-hydrochlorothiazide (PRINZIDE) 20-12.5 MG per tablet, Take 1 tablet by mouth every day., Disp: 90 tablet, Rfl: 3  •  metFORMIN (GLUCOPHAGE) 500 MG Tab, Take 1 Tab by mouth 4 times a day., Disp: 360 Tab, Rfl: 3  •  VOLTAREN 1 % Gel, Apply 4 g of 1% gel to affected area 4 times daily as needed (maximum: 16 g per joint per day) for pain, Disp: 100 g, Rfl: 3  •  meloxicam (MOBIC) 15 MG tablet, Take 15 mg by mouth every day., Disp: , Rfl:     Objective:     Vitals: /68   Pulse 68   Temp 36.4 °C (97.6 °F)   Resp 16   Ht 1.6 m (5' 3\")   Wt 64.4 kg (142 lb)   SpO2 97%   BMI 25.15 kg/m²   General: Alert  HEENT: Normocephalic.   Heart: Regular rate and rhythm.  S1 and S2 normal.  No murmurs appreciated.  Respiratory: Normal respiratory effort.  Clear to auscultation bilaterally.  Abdomen: Non-distended, soft  Extremities: No leg edema.    Assessment/Plan:     Sveta was seen today for follow-up.    Diagnoses and all orders for this visit:    Type 2 diabetes mellitus without complication, without long-term current use of insulin (HCC)  Chronic, stable, continue Metformin.  Follow-up in 6 months  -     POCT Hemoglobin A1C  -     Comp Metabolic Panel; Future  -     MICROALBUMIN CREAT RATIO URINE; Future    Essential hypertension  Chronic, stable, continue lisinopril-hydrochlorothiazide  -     Comp Metabolic Panel; Future    Hyperlipidemia, unspecified hyperlipidemia type  Chronic, stable, continue pravastatin  -     Lipid Profile; Future  -     Comp Metabolic " Panel; Future    Encounter for screening for malignant neoplasm of prostate  -     PROSTATE SPECIFIC AG SCREENING; Future          Return in about 6 months (around 1/26/2022) for Med check.

## 2021-08-02 ENCOUNTER — HOSPITAL ENCOUNTER (OUTPATIENT)
Dept: LAB | Facility: MEDICAL CENTER | Age: 71
End: 2021-08-02
Attending: FAMILY MEDICINE
Payer: MEDICARE

## 2021-08-02 DIAGNOSIS — E78.5 HYPERLIPIDEMIA, UNSPECIFIED HYPERLIPIDEMIA TYPE: ICD-10-CM

## 2021-08-02 DIAGNOSIS — Z12.5 ENCOUNTER FOR SCREENING FOR MALIGNANT NEOPLASM OF PROSTATE: ICD-10-CM

## 2021-08-02 DIAGNOSIS — I10 ESSENTIAL HYPERTENSION: ICD-10-CM

## 2021-08-02 DIAGNOSIS — E11.9 TYPE 2 DIABETES MELLITUS WITHOUT COMPLICATION, WITHOUT LONG-TERM CURRENT USE OF INSULIN (HCC): ICD-10-CM

## 2021-08-02 LAB
ALBUMIN SERPL BCP-MCNC: 4.8 G/DL (ref 3.2–4.9)
ALBUMIN/GLOB SERPL: 2.1 G/DL
ALP SERPL-CCNC: 39 U/L (ref 30–99)
ALT SERPL-CCNC: 21 U/L (ref 2–50)
ANION GAP SERPL CALC-SCNC: 13 MMOL/L (ref 7–16)
AST SERPL-CCNC: 19 U/L (ref 12–45)
BILIRUB SERPL-MCNC: 0.5 MG/DL (ref 0.1–1.5)
BUN SERPL-MCNC: 11 MG/DL (ref 8–22)
CALCIUM SERPL-MCNC: 10.6 MG/DL (ref 8.5–10.5)
CHLORIDE SERPL-SCNC: 101 MMOL/L (ref 96–112)
CHOLEST SERPL-MCNC: 186 MG/DL (ref 100–199)
CO2 SERPL-SCNC: 26 MMOL/L (ref 20–33)
CREAT SERPL-MCNC: 0.99 MG/DL (ref 0.5–1.4)
CREAT UR-MCNC: 29.2 MG/DL
GLOBULIN SER CALC-MCNC: 2.3 G/DL (ref 1.9–3.5)
GLUCOSE SERPL-MCNC: 129 MG/DL (ref 65–99)
HDLC SERPL-MCNC: 62 MG/DL
LDLC SERPL CALC-MCNC: 110 MG/DL
MICROALBUMIN UR-MCNC: <1.2 MG/DL
MICROALBUMIN/CREAT UR: NORMAL MG/G (ref 0–30)
POTASSIUM SERPL-SCNC: 4.3 MMOL/L (ref 3.6–5.5)
PROT SERPL-MCNC: 7.1 G/DL (ref 6–8.2)
PSA SERPL-MCNC: 1.3 NG/ML (ref 0–4)
SODIUM SERPL-SCNC: 140 MMOL/L (ref 135–145)
TRIGL SERPL-MCNC: 72 MG/DL (ref 0–149)

## 2021-08-02 PROCEDURE — 36415 COLL VENOUS BLD VENIPUNCTURE: CPT

## 2021-08-02 PROCEDURE — 84153 ASSAY OF PSA TOTAL: CPT | Mod: GA

## 2021-08-02 PROCEDURE — 80061 LIPID PANEL: CPT

## 2021-08-02 PROCEDURE — 82043 UR ALBUMIN QUANTITATIVE: CPT

## 2021-08-02 PROCEDURE — 80053 COMPREHEN METABOLIC PANEL: CPT

## 2021-08-02 PROCEDURE — 82570 ASSAY OF URINE CREATININE: CPT

## 2021-08-30 DIAGNOSIS — E83.52 HYPERCALCEMIA: ICD-10-CM

## 2021-09-01 ENCOUNTER — HOSPITAL ENCOUNTER (OUTPATIENT)
Dept: LAB | Facility: MEDICAL CENTER | Age: 71
End: 2021-09-01
Attending: FAMILY MEDICINE
Payer: MEDICARE

## 2021-09-01 DIAGNOSIS — E83.52 HYPERCALCEMIA: ICD-10-CM

## 2021-09-01 LAB
25(OH)D3 SERPL-MCNC: 56 NG/ML (ref 30–100)
ANION GAP SERPL CALC-SCNC: 12 MMOL/L (ref 7–16)
BUN SERPL-MCNC: 10 MG/DL (ref 8–22)
CALCIUM SERPL-MCNC: 9.9 MG/DL (ref 8.5–10.5)
CHLORIDE SERPL-SCNC: 103 MMOL/L (ref 96–112)
CO2 SERPL-SCNC: 26 MMOL/L (ref 20–33)
CREAT SERPL-MCNC: 0.95 MG/DL (ref 0.5–1.4)
GLUCOSE SERPL-MCNC: 109 MG/DL (ref 65–99)
POTASSIUM SERPL-SCNC: 4.3 MMOL/L (ref 3.6–5.5)
PTH-INTACT SERPL-MCNC: 24.9 PG/ML (ref 14–72)
SODIUM SERPL-SCNC: 141 MMOL/L (ref 135–145)

## 2021-09-01 PROCEDURE — 82306 VITAMIN D 25 HYDROXY: CPT

## 2021-09-01 PROCEDURE — 36415 COLL VENOUS BLD VENIPUNCTURE: CPT

## 2021-09-01 PROCEDURE — 83970 ASSAY OF PARATHORMONE: CPT

## 2021-09-01 PROCEDURE — 80048 BASIC METABOLIC PNL TOTAL CA: CPT

## 2021-10-21 ENCOUNTER — TELEPHONE (OUTPATIENT)
Dept: OCCUPATIONAL THERAPY | Facility: REHABILITATION | Age: 71
End: 2021-10-21

## 2021-10-21 NOTE — OP THERAPY DISCHARGE SUMMARY
Outpatient Occupational Therapy  DISCHARGE SUMMARY NOTE    [facility] Occupational Therapy [address]  [address]  [place] [address]  Phone:  [phone]  Fax:  [phone]    Date of Visit: 10/21/2021    Patient: Sveta Knox  YOB: 1950  MRN: [id number]     Referring Provider: No referring provider defined for this encounter.   Referring Diagnosis: No admission diagnoses are documented for this encounter.           Your patient is being discharged from Occupational Therapy with the following comments:   · Patient has failed to schedule or reschedule follow-up visits    Comments  Seen for initial evaluation but not seen for follow up visitsl  Limitations Remaining:  unknown    Recommendations:  Please re-refer if patient has continued problems.    Yolie Abraham OTR/L    Date: 10/21/2021

## 2021-10-22 ENCOUNTER — TELEPHONE (OUTPATIENT)
Dept: HEALTH INFORMATION MANAGEMENT | Facility: OTHER | Age: 71
End: 2021-10-22

## 2021-10-22 NOTE — TELEPHONE ENCOUNTER
Outcome: Pt returned call. Declined [facility] not interested.     Please transfer to Patient Outreach Team at [phone] when patient returns call.      [facility] Verified: yes    Attempt # 1

## 2021-10-22 NOTE — TELEPHONE ENCOUNTER
Outcome: Left Message [facility]    Please transfer to Patient Outreach Team at [phone] when patient returns call.    Attempt # 1

## 2022-01-31 ENCOUNTER — OFFICE VISIT (OUTPATIENT)
Dept: MEDICAL GROUP | Facility: MEDICAL CENTER | Age: 72
End: 2022-01-31
Payer: MEDICARE

## 2022-01-31 VITALS
HEART RATE: 62 BPM | SYSTOLIC BLOOD PRESSURE: 124 MMHG | WEIGHT: 155 LBS | RESPIRATION RATE: 16 BRPM | OXYGEN SATURATION: 96 % | BODY MASS INDEX: 27.46 KG/M2 | TEMPERATURE: 97.7 F | HEIGHT: 63 IN | DIASTOLIC BLOOD PRESSURE: 70 MMHG

## 2022-01-31 DIAGNOSIS — I10 ESSENTIAL HYPERTENSION: ICD-10-CM

## 2022-01-31 DIAGNOSIS — E78.5 HYPERLIPIDEMIA, UNSPECIFIED HYPERLIPIDEMIA TYPE: ICD-10-CM

## 2022-01-31 DIAGNOSIS — E11.9 TYPE 2 DIABETES MELLITUS WITHOUT COMPLICATION, WITHOUT LONG-TERM CURRENT USE OF INSULIN (HCC): ICD-10-CM

## 2022-01-31 LAB
HBA1C MFR BLD: 7.4 % (ref 0–5.6)
INT CON NEG: NEGATIVE
INT CON POS: POSITIVE

## 2022-01-31 PROCEDURE — 99214 OFFICE O/P EST MOD 30 MIN: CPT | Performed by: FAMILY MEDICINE

## 2022-01-31 PROCEDURE — 83036 HEMOGLOBIN GLYCOSYLATED A1C: CPT | Performed by: FAMILY MEDICINE

## 2022-01-31 ASSESSMENT — PATIENT HEALTH QUESTIONNAIRE - PHQ9: CLINICAL INTERPRETATION OF PHQ2 SCORE: 0

## 2022-01-31 NOTE — PROGRESS NOTES
"  Subjective:     Sveta Knox is a 71 y.o. male presenting with his wife for a follow-up.  He reports that his diet has not been as healthy over the holidays.  His A1c today is 7.4%.  He continues on Metformin.  Denies any side effects.        Current Outpatient Medications:   •  metFORMIN (GLUCOPHAGE) 500 MG Tab, Take 1 Tablet by mouth 4 times a day., Disp: 360 Tablet, Rfl: 3  •  pravastatin (PRAVACHOL) 40 MG tablet, Take 1 tablet by mouth every day., Disp: 90 tablet, Rfl: 3  •  lisinopril-hydrochlorothiazide (PRINZIDE) 20-12.5 MG per tablet, Take 1 tablet by mouth every day., Disp: 90 tablet, Rfl: 3  •  VOLTAREN 1 % Gel, Apply 4 g of 1% gel to affected area 4 times daily as needed (maximum: 16 g per joint per day) for pain, Disp: 100 g, Rfl: 3  •  meloxicam (MOBIC) 15 MG tablet, Take 15 mg by mouth every day., Disp: , Rfl:     Objective:     Vitals: /70   Pulse 62   Temp 36.5 °C (97.7 °F)   Resp 16   Ht 1.6 m (5' 3\")   Wt 70.3 kg (155 lb)   SpO2 96%   BMI 27.46 kg/m²   General: Alert  HEENT: Normocephalic.   Heart: Regular rate and rhythm.  S1 and S2 normal.  No murmurs appreciated.  Respiratory: Normal respiratory effort.  Clear to auscultation bilaterally.  Abdomen: Non-distended, soft  Extremities: Distal pulses 2+ symmetric.  No edema, skin lesions; toe nails clean.  Normal monofilament.  Achilles reflexes 2+ symmetric.    Assessment/Plan:     Sveta was seen today for follow-up.    Diagnoses and all orders for this visit:    Type 2 diabetes mellitus without complication, without long-term current use of insulin (HCC)  Chronic, slightly worsened.  Patient plans work on lifestyle modification.  Continue Metformin.  Follow-up in 6 months  -     POCT Hemoglobin A1C  -     Diabetic Monofilament Lower Extremity Exam    Essential hypertension  Chronic, stable, continue lisinopril-hydrochlorothiazide    Hyperlipidemia, unspecified hyperlipidemia type  Chronic, stable, continue " pravastatin        Return in about 6 months (around 7/31/2022) for routine follow up.

## 2022-02-08 RX ORDER — LISINOPRIL AND HYDROCHLOROTHIAZIDE 20; 12.5 MG/1; MG/1
1 TABLET ORAL DAILY
Qty: 90 TABLET | Refills: 3 | Status: SHIPPED | OUTPATIENT
Start: 2022-02-08 | End: 2022-11-17

## 2022-03-22 ENCOUNTER — TELEPHONE (OUTPATIENT)
Dept: MEDICAL GROUP | Facility: MEDICAL CENTER | Age: 72
End: 2022-03-22
Payer: MEDICARE

## 2022-03-22 DIAGNOSIS — E11.9 TYPE 2 DIABETES MELLITUS WITHOUT COMPLICATION, WITHOUT LONG-TERM CURRENT USE OF INSULIN (HCC): ICD-10-CM

## 2022-03-22 NOTE — TELEPHONE ENCOUNTER
Dr. Ferreira,      The patient called in regards to needing a referral to Ophthalmology and the patient has new insurance, [employer]. The patient would like an Ophthalmology that is in-network with this insurance.     You can reach the patient at [phone].    Lauryn

## 2022-08-05 ENCOUNTER — OFFICE VISIT (OUTPATIENT)
Dept: MEDICAL GROUP | Facility: MEDICAL CENTER | Age: 72
End: 2022-08-05
Payer: MEDICARE

## 2022-08-05 VITALS
WEIGHT: 137 LBS | TEMPERATURE: 97.3 F | HEART RATE: 62 BPM | SYSTOLIC BLOOD PRESSURE: 118 MMHG | OXYGEN SATURATION: 97 % | BODY MASS INDEX: 24.27 KG/M2 | RESPIRATION RATE: 16 BRPM | DIASTOLIC BLOOD PRESSURE: 70 MMHG | HEIGHT: 63 IN

## 2022-08-05 DIAGNOSIS — I10 ESSENTIAL HYPERTENSION: ICD-10-CM

## 2022-08-05 DIAGNOSIS — E11.9 TYPE 2 DIABETES MELLITUS WITHOUT COMPLICATION, WITHOUT LONG-TERM CURRENT USE OF INSULIN (HCC): ICD-10-CM

## 2022-08-05 DIAGNOSIS — E78.5 HYPERLIPIDEMIA, UNSPECIFIED HYPERLIPIDEMIA TYPE: ICD-10-CM

## 2022-08-05 DIAGNOSIS — Z12.5 ENCOUNTER FOR SCREENING FOR MALIGNANT NEOPLASM OF PROSTATE: ICD-10-CM

## 2022-08-05 LAB
HBA1C MFR BLD: 6.6 % (ref 0–5.6)
INT CON NEG: NEGATIVE
INT CON POS: POSITIVE

## 2022-08-05 PROCEDURE — 83036 HEMOGLOBIN GLYCOSYLATED A1C: CPT | Performed by: FAMILY MEDICINE

## 2022-08-05 PROCEDURE — 99214 OFFICE O/P EST MOD 30 MIN: CPT | Performed by: FAMILY MEDICINE

## 2022-08-05 NOTE — PROGRESS NOTES
"  Subjective:     Sveta Knox is a 72 y.o. male presenting with his wife for a follow up          Current Outpatient Medications:   •  pravastatin (PRAVACHOL) 40 MG tablet, TAKE 1 TABLET BY MOUTH EVERY DAY, Disp: 90 Tablet, Rfl: 1  •  metFORMIN (GLUCOPHAGE) 500 MG Tab, Take 1 Tablet by mouth 4 times a day., Disp: 360 Tablet, Rfl: 3  •  lisinopril-hydrochlorothiazide (PRINZIDE) 20-12.5 MG per tablet, Take 1 Tablet by mouth every day., Disp: 90 Tablet, Rfl: 3  •  VOLTAREN 1 % Gel, Apply 4 g of 1% gel to affected area 4 times daily as needed (maximum: 16 g per joint per day) for pain, Disp: 100 g, Rfl: 3  •  meloxicam (MOBIC) 15 MG tablet, Take 15 mg by mouth every day., Disp: , Rfl:     Objective:     Vitals: /70   Pulse 62   Temp 36.3 °C (97.3 °F)   Resp 16   Ht 1.588 m (5' 2.5\")   Wt 62.1 kg (137 lb)   SpO2 97%   BMI 24.66 kg/m²   General: Alert  HEENT: Normocephalic.   Heart: Regular rate and rhythm.  S1 and S2 normal.  No murmurs appreciated.  Respiratory: Normal respiratory effort.  Clear to auscultation bilaterally.  Abdomen: Non-distended, soft  Extremities: No leg edema.    Assessment/Plan:     Sveta was seen today for follow-up.    Diagnoses and all orders for this visit:    Type 2 diabetes mellitus without complication, without long-term current use of insulin (HCC)  Chronic, stable, well controlled.  Continue metformin.  He has been working on a healthier diet, has lost weight.  Feels well overall, arthritis is improved.  He has an eye exam next week.  Follow-up in 6 months  -     POCT Hemoglobin A1C  -     CBC WITHOUT DIFFERENTIAL; Future  -     Comp Metabolic Panel; Future  -     MICROALBUMIN CREAT RATIO URINE; Future    Essential hypertension  Chronic, stable, continue lisinopril-hydrochlorothiazide  -     CBC WITHOUT DIFFERENTIAL; Future  -     Comp Metabolic Panel; Future    Hyperlipidemia, unspecified hyperlipidemia type  Chronic, stable, continue pravastatin  -     Comp Metabolic " Panel; Future  -     Lipid Profile; Future    Encounter for screening for malignant neoplasm of prostate  -     PROSTATE SPECIFIC AG SCREENING; Future          Return in about 6 months (around 2/5/2023) for routine follow up.

## 2022-08-31 ENCOUNTER — HOSPITAL ENCOUNTER (OUTPATIENT)
Dept: LAB | Facility: MEDICAL CENTER | Age: 72
End: 2022-08-31
Attending: FAMILY MEDICINE
Payer: MEDICARE

## 2022-08-31 DIAGNOSIS — I10 ESSENTIAL HYPERTENSION: ICD-10-CM

## 2022-08-31 DIAGNOSIS — E11.9 TYPE 2 DIABETES MELLITUS WITHOUT COMPLICATION, WITHOUT LONG-TERM CURRENT USE OF INSULIN (HCC): ICD-10-CM

## 2022-08-31 DIAGNOSIS — Z12.5 ENCOUNTER FOR SCREENING FOR MALIGNANT NEOPLASM OF PROSTATE: ICD-10-CM

## 2022-08-31 DIAGNOSIS — E78.5 HYPERLIPIDEMIA, UNSPECIFIED HYPERLIPIDEMIA TYPE: ICD-10-CM

## 2022-08-31 LAB
ALBUMIN SERPL BCP-MCNC: 4.5 G/DL (ref 3.2–4.9)
ALBUMIN/GLOB SERPL: 1.9 G/DL
ALP SERPL-CCNC: 43 U/L (ref 30–99)
ALT SERPL-CCNC: 16 U/L (ref 2–50)
ANION GAP SERPL CALC-SCNC: 13 MMOL/L (ref 7–16)
AST SERPL-CCNC: 17 U/L (ref 12–45)
BILIRUB SERPL-MCNC: 0.8 MG/DL (ref 0.1–1.5)
BUN SERPL-MCNC: 10 MG/DL (ref 8–22)
CALCIUM SERPL-MCNC: 9.7 MG/DL (ref 8.5–10.5)
CHLORIDE SERPL-SCNC: 103 MMOL/L (ref 96–112)
CHOLEST SERPL-MCNC: 166 MG/DL (ref 100–199)
CO2 SERPL-SCNC: 26 MMOL/L (ref 20–33)
CREAT SERPL-MCNC: 0.8 MG/DL (ref 0.5–1.4)
ERYTHROCYTE [DISTWIDTH] IN BLOOD BY AUTOMATED COUNT: 41.4 FL (ref 35.9–50)
FASTING STATUS PATIENT QL REPORTED: NORMAL
GFR SERPLBLD CREATININE-BSD FMLA CKD-EPI: 95 ML/MIN/1.73 M 2
GLOBULIN SER CALC-MCNC: 2.4 G/DL (ref 1.9–3.5)
GLUCOSE SERPL-MCNC: 108 MG/DL (ref 65–99)
HCT VFR BLD AUTO: 41.3 % (ref 42–52)
HDLC SERPL-MCNC: 60 MG/DL
HGB BLD-MCNC: 14.3 G/DL (ref 14–18)
LDLC SERPL CALC-MCNC: 93 MG/DL
MCH RBC QN AUTO: 31.3 PG (ref 27–33)
MCHC RBC AUTO-ENTMCNC: 34.6 G/DL (ref 33.7–35.3)
MCV RBC AUTO: 90.4 FL (ref 81.4–97.8)
PLATELET # BLD AUTO: 316 K/UL (ref 164–446)
PMV BLD AUTO: 9 FL (ref 9–12.9)
POTASSIUM SERPL-SCNC: 3.8 MMOL/L (ref 3.6–5.5)
PROT SERPL-MCNC: 6.9 G/DL (ref 6–8.2)
RBC # BLD AUTO: 4.57 M/UL (ref 4.7–6.1)
SODIUM SERPL-SCNC: 142 MMOL/L (ref 135–145)
TRIGL SERPL-MCNC: 64 MG/DL (ref 0–149)
WBC # BLD AUTO: 5.3 K/UL (ref 4.8–10.8)

## 2022-08-31 PROCEDURE — 80053 COMPREHEN METABOLIC PANEL: CPT

## 2022-08-31 PROCEDURE — 85027 COMPLETE CBC AUTOMATED: CPT

## 2022-08-31 PROCEDURE — 36415 COLL VENOUS BLD VENIPUNCTURE: CPT

## 2022-08-31 PROCEDURE — 84153 ASSAY OF PSA TOTAL: CPT

## 2022-08-31 PROCEDURE — 82570 ASSAY OF URINE CREATININE: CPT

## 2022-08-31 PROCEDURE — 82043 UR ALBUMIN QUANTITATIVE: CPT

## 2022-08-31 PROCEDURE — 80061 LIPID PANEL: CPT

## 2022-09-01 LAB
CREAT UR-MCNC: 35 MG/DL
MICROALBUMIN UR-MCNC: <1.2 MG/DL
MICROALBUMIN/CREAT UR: NORMAL MG/G (ref 0–30)
PSA SERPL-MCNC: 1.7 NG/ML (ref 0–4)

## 2022-10-11 ENCOUNTER — PATIENT MESSAGE (OUTPATIENT)
Dept: MEDICAL GROUP | Facility: MEDICAL CENTER | Age: 72
End: 2022-10-11
Payer: MEDICARE

## 2022-10-12 RX ORDER — TIRZEPATIDE 2.5 MG/.5ML
2.5 INJECTION, SOLUTION SUBCUTANEOUS
Qty: 2 ML | Refills: 3 | Status: SHIPPED | OUTPATIENT
Start: 2022-10-12 | End: 2023-02-13

## 2022-11-17 RX ORDER — LISINOPRIL AND HYDROCHLOROTHIAZIDE 20; 12.5 MG/1; MG/1
1 TABLET ORAL DAILY
Qty: 100 TABLET | Refills: 3 | Status: SHIPPED | OUTPATIENT
Start: 2022-11-17

## 2022-11-28 ENCOUNTER — DOCUMENTATION (OUTPATIENT)
Dept: HEALTH INFORMATION MANAGEMENT | Facility: OTHER | Age: 72
End: 2022-11-28
Payer: MEDICARE

## 2022-12-14 DIAGNOSIS — E78.5 HYPERLIPIDEMIA, UNSPECIFIED HYPERLIPIDEMIA TYPE: ICD-10-CM

## 2022-12-14 RX ORDER — PRAVASTATIN SODIUM 40 MG
40 TABLET ORAL
Qty: 100 TABLET | Refills: 3 | Status: SHIPPED | OUTPATIENT
Start: 2022-12-14

## 2023-02-13 ENCOUNTER — OFFICE VISIT (OUTPATIENT)
Dept: MEDICAL GROUP | Facility: MEDICAL CENTER | Age: 73
End: 2023-02-13
Payer: MEDICARE

## 2023-02-13 VITALS
SYSTOLIC BLOOD PRESSURE: 120 MMHG | TEMPERATURE: 97.6 F | OXYGEN SATURATION: 97 % | HEART RATE: 64 BPM | BODY MASS INDEX: 25.87 KG/M2 | WEIGHT: 146 LBS | DIASTOLIC BLOOD PRESSURE: 72 MMHG | HEIGHT: 63 IN | RESPIRATION RATE: 16 BRPM

## 2023-02-13 DIAGNOSIS — E78.5 HYPERLIPIDEMIA, UNSPECIFIED HYPERLIPIDEMIA TYPE: ICD-10-CM

## 2023-02-13 DIAGNOSIS — I10 ESSENTIAL HYPERTENSION: ICD-10-CM

## 2023-02-13 DIAGNOSIS — E11.9 TYPE 2 DIABETES MELLITUS WITHOUT COMPLICATION, WITHOUT LONG-TERM CURRENT USE OF INSULIN (HCC): ICD-10-CM

## 2023-02-13 LAB
HBA1C MFR BLD: 7.6 % (ref ?–5.8)
POCT INT CON NEG: NEGATIVE
POCT INT CON POS: POSITIVE

## 2023-02-13 PROCEDURE — 99214 OFFICE O/P EST MOD 30 MIN: CPT | Performed by: FAMILY MEDICINE

## 2023-02-13 PROCEDURE — 83036 HEMOGLOBIN GLYCOSYLATED A1C: CPT | Performed by: FAMILY MEDICINE

## 2023-02-13 ASSESSMENT — FIBROSIS 4 INDEX: FIB4 SCORE: 0.94

## 2023-02-13 ASSESSMENT — PATIENT HEALTH QUESTIONNAIRE - PHQ9: CLINICAL INTERPRETATION OF PHQ2 SCORE: 0

## 2023-02-13 NOTE — PROGRESS NOTES
"  Subjective:     Sveta Knox is a 72 y.o. male presenting for a follow up          Current Outpatient Medications:     metFORMIN (GLUCOPHAGE) 500 MG Tab, Take 1 Tablet by mouth 4 times a day., Disp: 400 Tablet, Rfl: 3    pravastatin (PRAVACHOL) 40 MG tablet, Take 1 Tablet by mouth every day., Disp: 100 Tablet, Rfl: 3    lisinopril-hydrochlorothiazide (PRINZIDE) 20-12.5 MG per tablet, Take 1 Tablet by mouth every day., Disp: 100 Tablet, Rfl: 3    VOLTAREN 1 % Gel, Apply 4 g of 1% gel to affected area 4 times daily as needed (maximum: 16 g per joint per day) for pain, Disp: 100 g, Rfl: 3    Objective:     Vitals: /72   Pulse 64   Temp 36.4 °C (97.6 °F)   Resp 16   Ht 1.588 m (5' 2.5\")   Wt 66.2 kg (146 lb)   SpO2 97%   BMI 26.28 kg/m²   General: Alert  Extremities: Distal pulses 2+ symmetric.  No edema, skin thickened at the heels, toe nails clean.  Normal monofilament.  Achilles reflexes 2+ symmetric.    Assessment/Plan:     Sveta was seen today for follow-up.    Diagnoses and all orders for this visit:    Type 2 diabetes mellitus without complication, without long-term current use of insulin (HCC)  Chronic, worsening.  His A1c increased to 7.4% today.  He reports that he has been eating more cereals, plans to improve his diet.  We will continue with metformin.  Foot exam is normal today.  -     POCT Hemoglobin A1C  -     Diabetic Monofilament Lower Extremity Exam  -     metFORMIN (GLUCOPHAGE) 500 MG Tab; Take 1 Tablet by mouth 4 times a day.    Essential hypertension  Chronic, stable, blood pressures have been normal.  We will continue lisinopril-hydrochlorothiazide    Hyperlipidemia, unspecified hyperlipidemia type  Chronic, stable, continue pravastatin.  Labs are up-to-date.        Return in about 6 months (around 8/13/2023) for routine follow up.      "

## 2023-02-27 PROBLEM — I15.2 HYPERTENSION ASSOCIATED WITH TYPE 2 DIABETES MELLITUS (HCC): Status: ACTIVE | Noted: 2018-07-23

## 2023-02-27 PROBLEM — E11.42 TYPE 2 DIABETES MELLITUS WITH DIABETIC POLYNEUROPATHY, WITHOUT LONG-TERM CURRENT USE OF INSULIN (HCC): Status: ACTIVE | Noted: 2018-07-23

## 2023-02-27 PROBLEM — E11.59 HYPERTENSION ASSOCIATED WITH TYPE 2 DIABETES MELLITUS (HCC): Status: ACTIVE | Noted: 2018-07-23

## 2023-02-27 PROBLEM — E11.42 DIABETIC PERIPHERAL NEUROPATHY (HCC): Status: ACTIVE | Noted: 2023-02-27

## 2023-02-27 PROBLEM — E11.65 TYPE 2 DIABETES MELLITUS WITH HYPERGLYCEMIA, WITHOUT LONG-TERM CURRENT USE OF INSULIN (HCC): Status: ACTIVE | Noted: 2018-07-23

## 2023-02-27 PROBLEM — E78.5 DYSLIPIDEMIA ASSOCIATED WITH TYPE 2 DIABETES MELLITUS (HCC): Status: ACTIVE | Noted: 2023-02-27

## 2023-02-27 PROBLEM — E11.69 DYSLIPIDEMIA ASSOCIATED WITH TYPE 2 DIABETES MELLITUS (HCC): Status: ACTIVE | Noted: 2023-02-27

## 2023-02-27 PROBLEM — E78.5 HYPERLIPIDEMIA: Status: RESOLVED | Noted: 2018-07-23 | Resolved: 2023-02-27

## 2023-02-28 ENCOUNTER — OFFICE VISIT (OUTPATIENT)
Dept: MEDICAL GROUP | Facility: MEDICAL CENTER | Age: 73
End: 2023-02-28
Payer: MEDICARE

## 2023-02-28 VITALS
HEART RATE: 72 BPM | RESPIRATION RATE: 16 BRPM | WEIGHT: 146 LBS | TEMPERATURE: 97.7 F | HEIGHT: 63 IN | OXYGEN SATURATION: 96 % | SYSTOLIC BLOOD PRESSURE: 122 MMHG | DIASTOLIC BLOOD PRESSURE: 72 MMHG | BODY MASS INDEX: 25.87 KG/M2

## 2023-02-28 DIAGNOSIS — H61.23 BILATERAL IMPACTED CERUMEN: ICD-10-CM

## 2023-02-28 PROCEDURE — 69210 REMOVE IMPACTED EAR WAX UNI: CPT | Performed by: FAMILY MEDICINE

## 2023-02-28 ASSESSMENT — FIBROSIS 4 INDEX: FIB4 SCORE: 0.94

## 2023-02-28 NOTE — PROGRESS NOTES
"  Subjective:     Sveta Knox is a 72 y.o. male presenting for ear wax cleaning. Was going to get a hearing screen and was told that he had ear wax          Current Outpatient Medications:     metFORMIN (GLUCOPHAGE) 500 MG Tab, Take 1 Tablet by mouth 4 times a day., Disp: 400 Tablet, Rfl: 3    pravastatin (PRAVACHOL) 40 MG tablet, Take 1 Tablet by mouth every day., Disp: 100 Tablet, Rfl: 3    lisinopril-hydrochlorothiazide (PRINZIDE) 20-12.5 MG per tablet, Take 1 Tablet by mouth every day., Disp: 100 Tablet, Rfl: 3    VOLTAREN 1 % Gel, Apply 4 g of 1% gel to affected area 4 times daily as needed (maximum: 16 g per joint per day) for pain, Disp: 100 g, Rfl: 3    Objective:     Vitals: /72   Pulse 72   Temp 36.5 °C (97.7 °F)   Resp 16   Ht 1.588 m (5' 2.5\")   Wt 66.2 kg (146 lb)   SpO2 96%   BMI 26.28 kg/m²   General: Alert  HEENT: Normocephalic. Tympanic membranes pearly, translucent bilaterally after removal of cerumen        Procedure note    Procedure: Cerumen removal  Indications: Impacted cerumen    Patient was prepped and bilateral ears were lavaged by medical assistant.  Residual wax was curetted by MD with resolution of impaction. TM visualized with otoscope.        Assessment/Plan:     Diagnoses and all orders for this visit:    Bilateral impacted cerumen  Irrigated and manually removed today.             "

## 2023-06-02 ENCOUNTER — DOCUMENTATION (OUTPATIENT)
Dept: HEALTH INFORMATION MANAGEMENT | Facility: OTHER | Age: 73
End: 2023-06-02
Payer: MEDICARE

## 2023-06-29 NOTE — TELEPHONE ENCOUNTER
Insurance prefers brand name for the diclofenac gel. Please resend script voltaren.    Thanks   Validate Note Data When Using Inventory: Yes

## 2023-08-21 PROBLEM — E11.69 DYSLIPIDEMIA ASSOCIATED WITH TYPE 2 DIABETES MELLITUS (HCC): Status: RESOLVED | Noted: 2023-02-27 | Resolved: 2023-08-21

## 2023-08-21 PROBLEM — I15.2 HYPERTENSION ASSOCIATED WITH TYPE 2 DIABETES MELLITUS (HCC): Status: RESOLVED | Noted: 2018-07-23 | Resolved: 2023-08-21

## 2023-08-21 PROBLEM — I10 PRIMARY HYPERTENSION: Status: ACTIVE | Noted: 2023-08-21

## 2023-08-21 PROBLEM — E11.42 DIABETIC PERIPHERAL NEUROPATHY (HCC): Status: RESOLVED | Noted: 2023-02-27 | Resolved: 2023-08-21

## 2023-08-21 PROBLEM — E11.59 HYPERTENSION ASSOCIATED WITH TYPE 2 DIABETES MELLITUS (HCC): Status: RESOLVED | Noted: 2018-07-23 | Resolved: 2023-08-21

## 2023-08-21 PROBLEM — E78.5 DYSLIPIDEMIA ASSOCIATED WITH TYPE 2 DIABETES MELLITUS (HCC): Status: RESOLVED | Noted: 2023-02-27 | Resolved: 2023-08-21

## 2023-08-22 ENCOUNTER — OFFICE VISIT (OUTPATIENT)
Dept: MEDICAL GROUP | Facility: MEDICAL CENTER | Age: 73
End: 2023-08-22
Payer: MEDICARE

## 2023-08-22 VITALS
HEART RATE: 60 BPM | RESPIRATION RATE: 16 BRPM | TEMPERATURE: 97.6 F | SYSTOLIC BLOOD PRESSURE: 124 MMHG | OXYGEN SATURATION: 94 % | WEIGHT: 140 LBS | HEIGHT: 63 IN | BODY MASS INDEX: 24.8 KG/M2 | DIASTOLIC BLOOD PRESSURE: 70 MMHG

## 2023-08-22 DIAGNOSIS — Z12.5 ENCOUNTER FOR SCREENING FOR MALIGNANT NEOPLASM OF PROSTATE: ICD-10-CM

## 2023-08-22 DIAGNOSIS — E78.5 HYPERLIPIDEMIA, UNSPECIFIED HYPERLIPIDEMIA TYPE: ICD-10-CM

## 2023-08-22 DIAGNOSIS — E11.42 TYPE 2 DIABETES MELLITUS WITH DIABETIC POLYNEUROPATHY, WITHOUT LONG-TERM CURRENT USE OF INSULIN (HCC): ICD-10-CM

## 2023-08-22 DIAGNOSIS — I10 PRIMARY HYPERTENSION: ICD-10-CM

## 2023-08-22 LAB
HBA1C MFR BLD: 6.7 % (ref ?–5.8)
POCT INT CON NEG: NEGATIVE
POCT INT CON POS: POSITIVE

## 2023-08-22 PROCEDURE — 3078F DIAST BP <80 MM HG: CPT | Performed by: FAMILY MEDICINE

## 2023-08-22 PROCEDURE — 99214 OFFICE O/P EST MOD 30 MIN: CPT | Performed by: FAMILY MEDICINE

## 2023-08-22 PROCEDURE — 3074F SYST BP LT 130 MM HG: CPT | Performed by: FAMILY MEDICINE

## 2023-08-22 PROCEDURE — 83036 HEMOGLOBIN GLYCOSYLATED A1C: CPT | Performed by: FAMILY MEDICINE

## 2023-08-22 ASSESSMENT — FIBROSIS 4 INDEX: FIB4 SCORE: 0.95

## 2023-08-22 NOTE — PROGRESS NOTES
"  Subjective:     Sveta Knox is a 73 y.o. male presenting for a follow up          Current Outpatient Medications:     metFORMIN (GLUCOPHAGE) 500 MG Tab, Take 1 Tablet by mouth 4 times a day., Disp: 400 Tablet, Rfl: 3    pravastatin (PRAVACHOL) 40 MG tablet, Take 1 Tablet by mouth every day., Disp: 100 Tablet, Rfl: 3    lisinopril-hydrochlorothiazide (PRINZIDE) 20-12.5 MG per tablet, Take 1 Tablet by mouth every day., Disp: 100 Tablet, Rfl: 3    VOLTAREN 1 % Gel, Apply 4 g of 1% gel to affected area 4 times daily as needed (maximum: 16 g per joint per day) for pain, Disp: 100 g, Rfl: 3    Objective:     Vitals: /70   Pulse 60   Temp 36.4 °C (97.6 °F)   Resp 16   Ht 1.588 m (5' 2.5\")   Wt 63.5 kg (140 lb)   SpO2 94%   BMI 25.20 kg/m²   General: Alert  HEENT: Normocephalic.  Heart: Regular rate and rhythm.  S1 and S2 normal.  No murmurs appreciated.  Respiratory: Normal respiratory effort.  Clear to auscultation bilaterally.  Abdomen: Non-distended, soft  Extremities: No leg edema.    Assessment/Plan:     Diagnoses and all orders for this visit:    Type 2 diabetes mellitus with diabetic polyneuropathy, without long-term current use of insulin (HCC)  Chronic, stable, is well controlled.  A1c today 6.7%.  He has been working on a healthier diet.  Has started walking regularly.  Will continue on metformin.  -     POCT Hemoglobin A1C  -     CBC WITHOUT DIFFERENTIAL; Future  -     Comp Metabolic Panel; Future  -     MICROALBUMIN CREAT RATIO URINE; Future    Primary hypertension  Chronic, stable, continue lisinopril-hydrochlorothiazide  -     CBC WITHOUT DIFFERENTIAL; Future  -     Comp Metabolic Panel; Future    Hyperlipidemia, unspecified hyperlipidemia type  Chronic, stable, continue pravastatin  -     Comp Metabolic Panel; Future  -     Lipid Profile; Future    Encounter for screening for malignant neoplasm of prostate  -     PROSTATE SPECIFIC AG SCREENING; Future        Return in about 6 months " (around 2/22/2024) for a routine follow up.

## 2023-09-06 ENCOUNTER — HOSPITAL ENCOUNTER (OUTPATIENT)
Dept: LAB | Facility: MEDICAL CENTER | Age: 73
End: 2023-09-06
Attending: FAMILY MEDICINE
Payer: MEDICARE

## 2023-09-06 DIAGNOSIS — I10 PRIMARY HYPERTENSION: ICD-10-CM

## 2023-09-06 DIAGNOSIS — E78.5 HYPERLIPIDEMIA, UNSPECIFIED HYPERLIPIDEMIA TYPE: ICD-10-CM

## 2023-09-06 DIAGNOSIS — E11.42 TYPE 2 DIABETES MELLITUS WITH DIABETIC POLYNEUROPATHY, WITHOUT LONG-TERM CURRENT USE OF INSULIN (HCC): ICD-10-CM

## 2023-09-06 DIAGNOSIS — Z12.5 ENCOUNTER FOR SCREENING FOR MALIGNANT NEOPLASM OF PROSTATE: ICD-10-CM

## 2023-09-06 LAB
ALBUMIN SERPL BCP-MCNC: 4.8 G/DL (ref 3.2–4.9)
ALBUMIN/GLOB SERPL: 2.1 G/DL
ALP SERPL-CCNC: 43 U/L (ref 30–99)
ALT SERPL-CCNC: 22 U/L (ref 2–50)
ANION GAP SERPL CALC-SCNC: 11 MMOL/L (ref 7–16)
AST SERPL-CCNC: 17 U/L (ref 12–45)
BILIRUB SERPL-MCNC: 0.6 MG/DL (ref 0.1–1.5)
BUN SERPL-MCNC: 18 MG/DL (ref 8–22)
CALCIUM ALBUM COR SERPL-MCNC: 8.9 MG/DL (ref 8.5–10.5)
CALCIUM SERPL-MCNC: 9.5 MG/DL (ref 8.5–10.5)
CHLORIDE SERPL-SCNC: 102 MMOL/L (ref 96–112)
CHOLEST SERPL-MCNC: 173 MG/DL (ref 100–199)
CO2 SERPL-SCNC: 27 MMOL/L (ref 20–33)
CREAT SERPL-MCNC: 0.92 MG/DL (ref 0.5–1.4)
CREAT UR-MCNC: 15.72 MG/DL
ERYTHROCYTE [DISTWIDTH] IN BLOOD BY AUTOMATED COUNT: 41.1 FL (ref 35.9–50)
FASTING STATUS PATIENT QL REPORTED: NORMAL
GFR SERPLBLD CREATININE-BSD FMLA CKD-EPI: 89 ML/MIN/1.73 M 2
GLOBULIN SER CALC-MCNC: 2.3 G/DL (ref 1.9–3.5)
GLUCOSE SERPL-MCNC: 131 MG/DL (ref 65–99)
HCT VFR BLD AUTO: 42.1 % (ref 42–52)
HDLC SERPL-MCNC: 60 MG/DL
HGB BLD-MCNC: 14.6 G/DL (ref 14–18)
LDLC SERPL CALC-MCNC: 101 MG/DL
MCH RBC QN AUTO: 31.5 PG (ref 27–33)
MCHC RBC AUTO-ENTMCNC: 34.7 G/DL (ref 32.3–36.5)
MCV RBC AUTO: 90.7 FL (ref 81.4–97.8)
MICROALBUMIN UR-MCNC: <1.2 MG/DL
MICROALBUMIN/CREAT UR: NORMAL MG/G (ref 0–30)
PLATELET # BLD AUTO: 249 K/UL (ref 164–446)
PMV BLD AUTO: 9.1 FL (ref 9–12.9)
POTASSIUM SERPL-SCNC: 4 MMOL/L (ref 3.6–5.5)
PROT SERPL-MCNC: 7.1 G/DL (ref 6–8.2)
PSA SERPL-MCNC: 1.8 NG/ML (ref 0–4)
RBC # BLD AUTO: 4.64 M/UL (ref 4.7–6.1)
SODIUM SERPL-SCNC: 140 MMOL/L (ref 135–145)
TRIGL SERPL-MCNC: 61 MG/DL (ref 0–149)
WBC # BLD AUTO: 5 K/UL (ref 4.8–10.8)

## 2023-09-06 PROCEDURE — 82043 UR ALBUMIN QUANTITATIVE: CPT

## 2023-09-06 PROCEDURE — 85027 COMPLETE CBC AUTOMATED: CPT

## 2023-09-06 PROCEDURE — 36415 COLL VENOUS BLD VENIPUNCTURE: CPT

## 2023-09-06 PROCEDURE — 80053 COMPREHEN METABOLIC PANEL: CPT

## 2023-09-06 PROCEDURE — 84153 ASSAY OF PSA TOTAL: CPT

## 2023-09-06 PROCEDURE — 80061 LIPID PANEL: CPT

## 2023-09-06 PROCEDURE — 82570 ASSAY OF URINE CREATININE: CPT

## 2023-09-15 ENCOUNTER — TELEPHONE (OUTPATIENT)
Dept: HEALTH INFORMATION MANAGEMENT | Facility: OTHER | Age: 73
End: 2023-09-15
Payer: MEDICARE

## 2023-09-15 NOTE — TELEPHONE ENCOUNTER
I called pt's step son to schedule an appt for him. / Pt confirmed that he already scheduled an appt to establish care w/ new PCP.     Pt complete ERLIN ion 2/27/2023

## 2023-10-19 ENCOUNTER — OFFICE VISIT (OUTPATIENT)
Dept: MEDICAL GROUP | Facility: MEDICAL CENTER | Age: 73
End: 2023-10-19
Payer: MEDICARE

## 2023-10-19 VITALS
RESPIRATION RATE: 16 BRPM | OXYGEN SATURATION: 97 % | DIASTOLIC BLOOD PRESSURE: 70 MMHG | WEIGHT: 142 LBS | HEART RATE: 68 BPM | BODY MASS INDEX: 25.16 KG/M2 | TEMPERATURE: 97.8 F | SYSTOLIC BLOOD PRESSURE: 124 MMHG | HEIGHT: 63 IN

## 2023-10-19 DIAGNOSIS — I10 PRIMARY HYPERTENSION: ICD-10-CM

## 2023-10-19 DIAGNOSIS — Z76.89 ENCOUNTER TO ESTABLISH CARE: ICD-10-CM

## 2023-10-19 DIAGNOSIS — E78.5 HYPERLIPIDEMIA, UNSPECIFIED HYPERLIPIDEMIA TYPE: ICD-10-CM

## 2023-10-19 DIAGNOSIS — E11.42 TYPE 2 DIABETES MELLITUS WITH DIABETIC POLYNEUROPATHY, WITHOUT LONG-TERM CURRENT USE OF INSULIN (HCC): ICD-10-CM

## 2023-10-19 PROCEDURE — 99214 OFFICE O/P EST MOD 30 MIN: CPT | Performed by: NURSE PRACTITIONER

## 2023-10-19 PROCEDURE — 3074F SYST BP LT 130 MM HG: CPT | Performed by: NURSE PRACTITIONER

## 2023-10-19 PROCEDURE — 3078F DIAST BP <80 MM HG: CPT | Performed by: NURSE PRACTITIONER

## 2023-10-19 ASSESSMENT — FIBROSIS 4 INDEX: FIB4 SCORE: 1.03

## 2023-10-19 NOTE — PROGRESS NOTES
Chief Complaint   Patient presents with    [facility]      Sveta Karol Knox is a 73 y.o. male here to establish care and to discuss the evaluation and management of:    Previous PCP Dr. Ferreira    Last labs 9/2023    1. Type 2 diabetes mellitus with diabetic polyneuropathy, without long-term current use of insulin (HCC)  Stable. He is taking Metformin 500 mg TID.    2. Primary hypertension  Stable with Lisinopril/HCTZ. Last labs 9/2023    3. Hyperlipidemia, unspecified hyperlipidemia type  Taking Pravastatin for this. No myalgias. Last Lipid panel 9/2023    Chart reviewed    ROS: SEE ABOVE        Current Outpatient Medications:     metFORMIN (GLUCOPHAGE) 500 MG Tab, Take 1 Tablet by mouth 4 times a day., Disp: 400 Tablet, Rfl: 3    pravastatin (PRAVACHOL) 40 MG tablet, Take 1 Tablet by mouth every day., Disp: 100 Tablet, Rfl: 3    lisinopril-hydrochlorothiazide (PRINZIDE) 20-12.5 MG per tablet, Take 1 Tablet by mouth every day., Disp: 100 Tablet, Rfl: 3    VOLTAREN 1 % Gel, Apply 4 g of 1% gel to affected area 4 times daily as needed (maximum: 16 g per joint per day) for pain, Disp: 100 g, Rfl: 3    Allergies   Allergen Reactions    Atorvastatin      Left sided weakness    Bactine     Keflex     Simvastatin      Left sided weakness       Past Medical History:   Diagnosis Date    Hyperlipidemia 7/23/2018     History reviewed. No pertinent surgical history.  History reviewed. No pertinent family history.  Social History     Socioeconomic History    Marital status:      Spouse name: Not on file    Number of children: Not on file    Years of education: Not on file    Highest education level: Not on file   Occupational History    Not on file   Tobacco Use    Smoking status: Former     Types: Cigarettes    Smokeless tobacco: Never   Vaping Use    Vaping Use: Never used   Substance and Sexual Activity    Alcohol use: Yes     Comment: about 1 drink/week    Drug use: No    Sexual activity: Not on file  "  Other Topics Concern    Not on file   Social History Narrative    Not on file     Social Determinants of Health     Financial Resource Strain: Not on file   Food Insecurity: Not on file   Transportation Needs: Not on file   Physical Activity: Not on file   Stress: Not on file   Social Connections: Not on file   Intimate Partner Violence: Not on file   Housing Stability: Not on file       Objective:     Vitals: /70   Pulse 68   Temp 36.6 °C (97.8 °F)   Resp 16   Ht 1.588 m (5' 2.5\")   Wt 64.4 kg (142 lb)   SpO2 97%   BMI 25.56 kg/m²      General: Alert, pleasant, NAD  HEENT:  Normocephalic.  Neck supple.  No thyromegaly or masses palpated. No cervical or supraclavicular lymphadenopathy.  Heart:  Regular rate and rhythm.  S1 and S2 normal.  No murmurs appreciated.    Respiratory:  Normal respiratory effort.  Clear to auscultation bilaterally.    Skin:  Warm, dry, no rashes  Musculoskeletal:  Gait is normal.  Moves all extremities well.  Extremities:   No leg edema.  Neurological: No tremors  Psych:  Affect/mood is normal, judgement is good, memory is intact, grooming is appropriate.    Assessment and Plan.     73 y.o. male to establish care and discuss the followin. Type 2 diabetes mellitus with diabetic polyneuropathy, without long-term current use of insulin (HCC)  Chronic. Stable. Continue Metformin 500mg TID.  Follow up 6 months. Check A1c in the clinic.     2. Primary hypertension  Chronic. Stable. Continue Lisinopril/HCTZ. Up to date on CMP.    3. Hyperlipidemia, unspecified hyperlipidemia type  LDL not at goal, -close at 101. Hx of allergy to Simvastatin and Atorvastatin.   He has been tolerating Pravastatin 40mg for quite some time.   Consider Rosuvastatin.    4. Encounter to establish care  Have reviewed chart.       Return in about 6 months (around 2024) for Diabetes-recheck A1c. .    Brandee LITTLE"

## 2023-11-27 ENCOUNTER — OFFICE VISIT (OUTPATIENT)
Dept: URGENT CARE | Facility: PHYSICIAN GROUP | Age: 73
End: 2023-11-27
Payer: MEDICARE

## 2023-11-27 ENCOUNTER — APPOINTMENT (OUTPATIENT)
Dept: URGENT CARE | Facility: PHYSICIAN GROUP | Age: 73
End: 2023-11-27

## 2023-11-27 VITALS
SYSTOLIC BLOOD PRESSURE: 118 MMHG | DIASTOLIC BLOOD PRESSURE: 70 MMHG | RESPIRATION RATE: 16 BRPM | HEART RATE: 78 BPM | WEIGHT: 138 LBS | TEMPERATURE: 97 F | BODY MASS INDEX: 24.45 KG/M2 | HEIGHT: 63 IN | OXYGEN SATURATION: 95 %

## 2023-11-27 DIAGNOSIS — K04.7 DENTAL INFECTION: ICD-10-CM

## 2023-11-27 PROCEDURE — 99213 OFFICE O/P EST LOW 20 MIN: CPT | Performed by: NURSE PRACTITIONER

## 2023-11-27 PROCEDURE — 3078F DIAST BP <80 MM HG: CPT | Performed by: NURSE PRACTITIONER

## 2023-11-27 PROCEDURE — 3074F SYST BP LT 130 MM HG: CPT | Performed by: NURSE PRACTITIONER

## 2023-11-27 RX ORDER — PENICILLIN V POTASSIUM 500 MG/1
500 TABLET ORAL 2 TIMES DAILY
Qty: 20 TABLET | Refills: 0 | Status: SHIPPED | OUTPATIENT
Start: 2023-11-27 | End: 2023-12-07

## 2023-11-27 RX ORDER — CHLORHEXIDINE GLUCONATE ORAL RINSE 1.2 MG/ML
SOLUTION DENTAL
COMMUNITY
Start: 2023-11-14 | End: 2023-11-27

## 2023-11-27 ASSESSMENT — ENCOUNTER SYMPTOMS
RESPIRATORY NEGATIVE: 1
CHILLS: 0
CONSTITUTIONAL NEGATIVE: 1
EYES NEGATIVE: 1
FEVER: 0
GASTROINTESTINAL NEGATIVE: 1
CARDIOVASCULAR NEGATIVE: 1
NEUROLOGICAL NEGATIVE: 1
SINUS PRESSURE: 0
MUSCULOSKELETAL NEGATIVE: 1

## 2023-11-27 ASSESSMENT — FIBROSIS 4 INDEX: FIB4 SCORE: 1.03

## 2023-11-27 NOTE — PROGRESS NOTES
"Subjective:   Sevta Knox is a 73 y.o. male who presents for Gum Problem (Possible gum infection, x5 days )      Dental Pain   This is a new problem. Episode onset: 5 days - has happened prior in this same location - just saw dentist two weeks ago for full exam and cleaning. The problem occurs constantly. The problem has been gradually worsening. The pain is at a severity of 6/10. The pain is moderate. Associated symptoms include facial pain and thermal sensitivity. Pertinent negatives include no fever or sinus pressure. He has tried NSAIDs and ice for the symptoms. The treatment provided mild relief.       Review of Systems   Constitutional: Negative.  Negative for chills and fever.   HENT: Negative.  Negative for sinus pressure.         Right lower dental infection, incisor   Eyes: Negative.    Respiratory: Negative.     Cardiovascular: Negative.    Gastrointestinal: Negative.    Genitourinary: Negative.    Musculoskeletal: Negative.    Skin: Negative.    Neurological: Negative.        Medications, Allergies, and current problem list reviewed today in Epic.     Objective:     /70 (BP Location: Right arm, Patient Position: Sitting, BP Cuff Size: Adult)   Pulse 78   Temp 36.1 °C (97 °F) (Temporal)   Resp 16   Ht 1.588 m (5' 2.5\")   Wt 62.6 kg (138 lb)   SpO2 95%     Physical Exam  Vitals reviewed.   Constitutional:       General: He is not in acute distress.     Appearance: Normal appearance. He is not ill-appearing.   HENT:      Head: Normocephalic and atraumatic.      Nose: Nose normal.      Mouth/Throat:      Lips: Pink.      Mouth: Mucous membranes are moist.      Dentition: Dental tenderness, gingival swelling and dental abscesses present. No dental caries or gum lesions.      Tongue: No lesions. Tongue does not deviate from midline.      Pharynx: Oropharynx is clear. Uvula midline. No pharyngeal swelling, oropharyngeal exudate, posterior oropharyngeal erythema or uvula swelling. "     Eyes:      Extraocular Movements: Extraocular movements intact.      Conjunctiva/sclera: Conjunctivae normal.      Pupils: Pupils are equal, round, and reactive to light.   Cardiovascular:      Rate and Rhythm: Normal rate and regular rhythm.      Pulses: Normal pulses.      Heart sounds: Normal heart sounds.   Pulmonary:      Effort: Pulmonary effort is normal.      Breath sounds: Normal breath sounds.   Abdominal:      General: Abdomen is flat. Bowel sounds are normal.      Palpations: Abdomen is soft.   Musculoskeletal:         General: Normal range of motion.      Cervical back: Normal range of motion and neck supple.   Skin:     General: Skin is warm and dry.      Capillary Refill: Capillary refill takes less than 2 seconds.   Neurological:      General: No focal deficit present.      Mental Status: He is alert and oriented to person, place, and time.   Psychiatric:         Mood and Affect: Mood normal.         Behavior: Behavior normal.         Assessment/Plan:     Diagnosis and associated orders:     1. Dental infection  penicillin v potassium (VEETID) 500 MG Tab         Comments/MDM:     Take naproxen prescription OR ibuprofen 600mg regularly every 8 hours for pain for the next 24-48 hours then as needed.  Take the antibiotic prescription until completed.  Call your dentist as soon as possible to set up an appointment for definitive treatment of your tooth problem.            Differential diagnosis, natural history, supportive care, and indications for immediate follow-up discussed.    Advised the patient to follow-up with the primary care physician for recheck, reevaluation, and consideration of further management.    Please note that this dictation was created using voice recognition software. I have made a reasonable attempt to correct obvious errors, but I expect that there are errors of grammar and possibly content that I did not discover before finalizing the note.    This note was electronically  signed by JOSE Melendez

## 2024-01-16 PROBLEM — E78.5 TYPE 2 DIABETES MELLITUS WITH HYPERLIPIDEMIA (HCC): Status: ACTIVE | Noted: 2018-07-23

## 2024-01-16 PROBLEM — I10 DIABETES MELLITUS WITH COINCIDENT HYPERTENSION (HCC): Status: ACTIVE | Noted: 2018-07-23

## 2024-01-16 PROBLEM — E11.69 TYPE 2 DIABETES MELLITUS WITH HYPERLIPIDEMIA (HCC): Status: ACTIVE | Noted: 2018-07-23

## 2024-01-16 NOTE — ASSESSMENT & PLAN NOTE
Chronic, stable. Currently taking lisinopril-hydrochlorothiazide 20-12.5 mg daily. Denies chest pain, lightheadedness, palpitations, and lower extremity edema.

## 2024-01-16 NOTE — ASSESSMENT & PLAN NOTE
Chronic, stable. Most recent hemoglobin A1C was 6.7 in August 2023. Reports that he is active. Currently taking metformin 500 mg four times daily. Followed by primary care provider.

## 2024-01-18 ENCOUNTER — OFFICE VISIT (OUTPATIENT)
Dept: FAMILY PLANNING/WOMEN'S HEALTH CLINIC | Facility: PHYSICIAN GROUP | Age: 74
End: 2024-01-18
Attending: FAMILY MEDICINE
Payer: MEDICARE

## 2024-01-18 VITALS
DIASTOLIC BLOOD PRESSURE: 78 MMHG | HEIGHT: 63 IN | WEIGHT: 139 LBS | BODY MASS INDEX: 24.63 KG/M2 | SYSTOLIC BLOOD PRESSURE: 122 MMHG

## 2024-01-18 DIAGNOSIS — E11.59 HYPERTENSION ASSOCIATED WITH TYPE 2 DIABETES MELLITUS (HCC): ICD-10-CM

## 2024-01-18 DIAGNOSIS — I15.2 HYPERTENSION ASSOCIATED WITH TYPE 2 DIABETES MELLITUS (HCC): ICD-10-CM

## 2024-01-18 DIAGNOSIS — E78.5 TYPE 2 DIABETES MELLITUS WITH HYPERLIPIDEMIA (HCC): ICD-10-CM

## 2024-01-18 DIAGNOSIS — M19.041 PRIMARY OSTEOARTHRITIS OF BOTH HANDS: ICD-10-CM

## 2024-01-18 DIAGNOSIS — M19.042 PRIMARY OSTEOARTHRITIS OF BOTH HANDS: ICD-10-CM

## 2024-01-18 DIAGNOSIS — E11.69 TYPE 2 DIABETES MELLITUS WITH HYPERLIPIDEMIA (HCC): ICD-10-CM

## 2024-01-18 DIAGNOSIS — E11.42 TYPE 2 DIABETES MELLITUS WITH DIABETIC POLYNEUROPATHY, WITHOUT LONG-TERM CURRENT USE OF INSULIN (HCC): ICD-10-CM

## 2024-01-18 PROBLEM — M19.049 PRIMARY OSTEOARTHRITIS OF HAND: Status: ACTIVE | Noted: 2018-07-23

## 2024-01-18 PROCEDURE — G0439 PPPS, SUBSEQ VISIT: HCPCS

## 2024-01-18 PROCEDURE — 3074F SYST BP LT 130 MM HG: CPT

## 2024-01-18 PROCEDURE — 3078F DIAST BP <80 MM HG: CPT

## 2024-01-18 PROCEDURE — 1125F AMNT PAIN NOTED PAIN PRSNT: CPT

## 2024-01-18 SDOH — ECONOMIC STABILITY: TRANSPORTATION INSECURITY
IN THE PAST 12 MONTHS, HAS LACK OF TRANSPORTATION KEPT YOU FROM MEETINGS, WORK, OR FROM GETTING THINGS NEEDED FOR DAILY LIVING?: NO

## 2024-01-18 SDOH — ECONOMIC STABILITY: TRANSPORTATION INSECURITY
IN THE PAST 12 MONTHS, HAS THE LACK OF TRANSPORTATION KEPT YOU FROM MEDICAL APPOINTMENTS OR FROM GETTING MEDICATIONS?: NO

## 2024-01-18 SDOH — ECONOMIC STABILITY: INCOME INSECURITY: IN THE PAST 12 MONTHS, HAS THE ELECTRIC, GAS, OIL, OR WATER COMPANY THREATENED TO SHUT OFF SERVICE IN YOUR HOME?: NO

## 2024-01-18 SDOH — ECONOMIC STABILITY: INCOME INSECURITY: IN THE LAST 12 MONTHS, WAS THERE A TIME WHEN YOU WERE NOT ABLE TO PAY THE MORTGAGE OR RENT ON TIME?: NO

## 2024-01-18 SDOH — HEALTH STABILITY: PHYSICAL HEALTH: ON AVERAGE, HOW MANY DAYS PER WEEK DO YOU ENGAGE IN MODERATE TO STRENUOUS EXERCISE (LIKE A BRISK WALK)?: 0 DAYS

## 2024-01-18 SDOH — ECONOMIC STABILITY: FOOD INSECURITY: WITHIN THE PAST 12 MONTHS, YOU WORRIED THAT YOUR FOOD WOULD RUN OUT BEFORE YOU GOT MONEY TO BUY MORE.: NEVER TRUE

## 2024-01-18 SDOH — ECONOMIC STABILITY: INCOME INSECURITY: HOW HARD IS IT FOR YOU TO PAY FOR THE VERY BASICS LIKE FOOD, HOUSING, MEDICAL CARE, AND HEATING?: NOT HARD AT ALL

## 2024-01-18 SDOH — HEALTH STABILITY: MENTAL HEALTH: HOW OFTEN DO YOU HAVE 6 OR MORE DRINKS ON ONE OCCASION?: LESS THAN MONTHLY

## 2024-01-18 SDOH — ECONOMIC STABILITY: FOOD INSECURITY: WITHIN THE PAST 12 MONTHS, THE FOOD YOU BOUGHT JUST DIDN'T LAST AND YOU DIDN'T HAVE MONEY TO GET MORE.: NEVER TRUE

## 2024-01-18 SDOH — SOCIAL STABILITY: SOCIAL NETWORK
DO YOU BELONG TO ANY CLUBS OR ORGANIZATIONS SUCH AS CHURCH GROUPS UNIONS, FRATERNAL OR ATHLETIC GROUPS, OR SCHOOL GROUPS?: NO

## 2024-01-18 SDOH — ECONOMIC STABILITY: HOUSING INSECURITY
IN THE LAST 12 MONTHS, WAS THERE A TIME WHEN YOU DID NOT HAVE A STEADY PLACE TO SLEEP OR SLEPT IN A SHELTER (INCLUDING NOW)?: NO

## 2024-01-18 SDOH — SOCIAL STABILITY: SOCIAL NETWORK: HOW OFTEN DO YOU GET TOGETHER WITH FRIENDS OR RELATIVES?: ONCE A WEEK

## 2024-01-18 SDOH — HEALTH STABILITY: MENTAL HEALTH: HOW OFTEN DO YOU HAVE A DRINK CONTAINING ALCOHOL?: MONTHLY OR LESS

## 2024-01-18 SDOH — HEALTH STABILITY: MENTAL HEALTH: HOW MANY STANDARD DRINKS CONTAINING ALCOHOL DO YOU HAVE ON A TYPICAL DAY?: 1 OR 2

## 2024-01-18 SDOH — HEALTH STABILITY: MENTAL HEALTH
STRESS IS WHEN SOMEONE FEELS TENSE, NERVOUS, ANXIOUS, OR CAN'T SLEEP AT NIGHT BECAUSE THEIR MIND IS TROUBLED. HOW STRESSED ARE YOU?: ONLY A LITTLE

## 2024-01-18 SDOH — SOCIAL STABILITY: SOCIAL NETWORK: ARE YOU MARRIED, WIDOWED, DIVORCED, SEPARATED, NEVER MARRIED, OR LIVING WITH A PARTNER?: WIDOWED

## 2024-01-18 SDOH — ECONOMIC STABILITY: HOUSING INSECURITY: IN THE LAST 12 MONTHS, HOW MANY PLACES HAVE YOU LIVED?: 1

## 2024-01-18 SDOH — SOCIAL STABILITY: SOCIAL NETWORK: HOW OFTEN DO YOU ATTENT MEETINGS OF THE CLUB OR ORGANIZATION YOU BELONG TO?: 1 TO 4 TIMES PER YEAR

## 2024-01-18 SDOH — SOCIAL STABILITY: SOCIAL NETWORK: HOW OFTEN DO YOU ATTEND CHURCH OR RELIGIOUS SERVICES?: NEVER

## 2024-01-18 SDOH — HEALTH STABILITY: PHYSICAL HEALTH: ON AVERAGE, HOW MANY MINUTES DO YOU ENGAGE IN EXERCISE AT THIS LEVEL?: 0 MIN

## 2024-01-18 SDOH — SOCIAL STABILITY: SOCIAL NETWORK
IN A TYPICAL WEEK, HOW MANY TIMES DO YOU TALK ON THE PHONE WITH FAMILY, FRIENDS, OR NEIGHBORS?: MORE THAN THREE TIMES A WEEK

## 2024-01-18 ASSESSMENT — ACTIVITIES OF DAILY LIVING (ADL): BATHING_REQUIRES_ASSISTANCE: 0

## 2024-01-18 ASSESSMENT — LIFESTYLE VARIABLES
SKIP TO QUESTIONS 9-10: 0
AUDIT-C TOTAL SCORE: 2

## 2024-01-18 ASSESSMENT — PAIN SCALES - GENERAL: PAINLEVEL: 2=MINIMAL-SLIGHT

## 2024-01-18 ASSESSMENT — PATIENT HEALTH QUESTIONNAIRE - PHQ9: CLINICAL INTERPRETATION OF PHQ2 SCORE: 0

## 2024-01-18 ASSESSMENT — ENCOUNTER SYMPTOMS: GENERAL WELL-BEING: GOOD

## 2024-01-18 ASSESSMENT — FIBROSIS 4 INDEX: FIB4 SCORE: 1.03

## 2024-01-18 NOTE — ASSESSMENT & PLAN NOTE
Chronic, ongoing. Reports osteoarthritis to bilateral hands. Well-controlled with voltaren gel as needed.

## 2024-01-18 NOTE — ASSESSMENT & PLAN NOTE
Chronic, stable. Reports ongoing neuropathy to hands, toes, and feet. No current medication. Denies worsening symptoms. Provided education on removing hazards from home including rugs and carpets, adequate lighting, and use of treaded slippers and socks.

## 2024-01-18 NOTE — PROGRESS NOTES
Comprehensive Health Assessment Program     Sveta Knox is a 73 y.o. here for his comprehensive health assessment.    Patient Active Problem List    Diagnosis Date Noted    Type 2 diabetes mellitus with diabetic polyneuropathy (HCC) 01/18/2024    Hypertension associated with type 2 diabetes mellitus (HCC) 07/23/2018    Type 2 diabetes mellitus with hyperlipidemia (HCC) 07/23/2018    Primary osteoarthritis of hand 07/23/2018       Current Outpatient Medications   Medication Sig Dispense Refill    metFORMIN (GLUCOPHAGE) 500 MG Tab Take 1 Tablet by mouth 4 times a day. 400 Tablet 3    pravastatin (PRAVACHOL) 40 MG tablet Take 1 Tablet by mouth every day. 100 Tablet 3    lisinopril-hydrochlorothiazide (PRINZIDE) 20-12.5 MG per tablet Take 1 Tablet by mouth every day. 100 Tablet 3     No current facility-administered medications for this visit.          Current supplements as per medication list.     Allergies:   Atorvastatin, Bactine, Keflex, and Simvastatin  Social History     Tobacco Use    Smoking status: Former     Types: Cigarettes    Smokeless tobacco: Never   Vaping Use    Vaping Use: Never used   Substance Use Topics    Alcohol use: Yes     Comment: about 1 drink/week    Drug use: No     No family history on file.  Sveta  has a past medical history of Hypercalcemia (01/26/2020), Hypercalcemia (01/26/2020), and Hyperlipidemia (07/23/2018).   No past surgical history on file.    Screening:  In the last six months have you experienced any leakage of urine? No    Depression Screening  Little interest or pleasure in doing things?  0 - not at all  Feeling down, depressed , or hopeless? 0 - not at all  Patient Health Questionnaire Score: 0     If depressive symptoms identified deferred to follow up visit unless specifically addressed in assessment and plan.    Interpretation of PHQ-9 Total Score   Score Severity   1-4 No Depression   5-9 Mild Depression   10-14 Moderate Depression   15-19 Moderately  Severe Depression   20-27 Severe Depression    Screening for Cognitive Impairment  Do you or any of your friends or family members have any concern about your memory? No  Three Minute Recall (Banana, Sunrise, Chair) 3/3    Kai clock face with all 12 numbers and set the hands to show 20 past 8.       Cognitive concerns identified deferred for follow up unless specifically addressed in assessment and plan.    Fall Risk Assessment  Has the patient had two or more falls in the last year or any fall with injury in the last year?  No    Safety Assessment  Do you always wear your seatbelt?  Yes  Any changes to home needed to function safely? No  Difficulty hearing.  Yes  Patient counseled about all safety risks that were identified.    Functional Assessment ADLs  Are there any barriers preventing you from cooking for yourself or meeting nutritional needs?  No.    Are there any barriers preventing you from driving safely or obtaining transportation?  No.    Are there any barriers preventing you from using a telephone or calling for help?  No    Are there any barriers preventing you from shopping?  No.    Are there any barriers preventing you from taking care of your own finances?  No    Are there any barriers preventing you from managing your medications?  No    Are there any barriers preventing you from showering, bathing or dressing yourself? No    Are there any barriers preventing you from doing housework or laundry? No  Are there any barriers preventing you from using the toilet?No  Are you currently engaging in any exercise or physical activity?  No.      Self-Assessment of Health  What is your perception of your health? Good  Do you sleep more than six hours a night? No  In the past 7 days, how much did pain keep you from doing your normal work? Some  Do you spend quality time with family or friends (virtually or in person)? Yes  Do you usually eat a heart healthy diet that constists of a variety of fruits,  vegetables, whole grains and fiber? Yes  Do you eat foods high in fat and/or Fast Food more than three times per week? No    Advance Care Planning  Do you have an Advance Directive, Living Will, Durable Power of , or POLST? No Provided patient with educational brochure regarding Advance Care Planning.                   Health Maintenance Summary            Overdue - Hepatitis B Vaccine (Hep B) (1 of 3 - Risk 3-dose series) Overdue - never done      No completion history exists for this topic.              Diabetes: Monofilament / LE Exam (Yearly) Due soon on 2/13/2024 02/13/2023  Diabetic Monofilament Lower Extremity Exam    01/31/2022  Diabetic Monofilament Lower Extremity Exam    01/11/2021  Diabetic Monofilament Lower Extremity Exam    07/08/2019  Diabetic Monofilament Lower Extremity Exam    07/23/2018  Diabetic Monofilament LE Exam              A1c Screening (Every 6 Months) Next due on 2/22/2024 08/22/2023  POCT Hemoglobin A1C    02/13/2023  POCT Hemoglobin A1C    08/05/2022  POCT Hemoglobin A1C    01/31/2022  POCT Hemoglobin A1C    07/26/2021  POCT Hemoglobin A1C    Only the first 5 history entries have been loaded, but more history exists.              Diabetes: Retinopathy Screening (Yearly) Next due on 4/21/2024 04/21/2023  AMB EXTERNAL RETINAL SCREENING RESULTS    03/28/2023  AMB EXTERNAL RETINAL SCREENING RESULTS    05/27/2022  REFERRAL FOR RETINAL SCREENING EXAM    03/23/2022  Referral for Retinal Screening Exam    08/12/2020  REFERRAL FOR RETINAL SCREENING EXAM              Fasting Lipid Profile (Yearly) Next due on 9/6/2024 09/06/2023  Lipid Profile    08/31/2022  Lipid Profile    08/02/2021  Lipid Profile    07/16/2020  Lipid Profile    07/01/2019  Lipid Profile    Only the first 5 history entries have been loaded, but more history exists.              Diabetes: Urine Protein Screening (Yearly) Next due on 9/6/2024 09/06/2023  MICROALBUMIN CREAT RATIO URINE     08/31/2022  MICROALBUMIN CREAT RATIO URINE    08/02/2021  MICROALBUMIN CREAT RATIO URINE    07/16/2020  MICROALBUMIN CREAT RATIO URINE    08/03/2018  MICROALBUMIN CREAT RATIO URINE    Only the first 5 history entries have been loaded, but more history exists.              SERUM CREATININE (Yearly) Next due on 9/6/2024 09/06/2023  Comp Metabolic Panel    08/31/2022  Comp Metabolic Panel    09/01/2021  Basic Metabolic Panel    08/02/2021  Comp Metabolic Panel    07/16/2020  Basic Metabolic Panel    Only the first 5 history entries have been loaded, but more history exists.              Prostate Specific Antigen (PSA) Screening (Yearly) Next due on 9/6/2024 09/06/2023  Prostatic Specific Antigen Tot component of PROSTATE SPECIFIC AG SCREENING    08/31/2022  Prostatic Specific Antigen Tot component of PROSTATE SPECIFIC AG SCREENING    08/02/2021  Prostatic Specific Antigen Tot component of PROSTATE SPECIFIC AG SCREENING    07/16/2020  Prostatic Specific Antigen Tot component of PROSTATE SPECIFIC AG SCREENING    07/01/2019  Prostatic Specific Antigen Tot component of PROSTATE SPECIFIC AG SCREENING    Only the first 5 history entries have been loaded, but more history exists.              Annual Wellness Visit (Yearly) Next due on 1/18/2025 01/18/2024  Level of Service: [place] ANNUAL WELLNESS VISIT-INCLUDES PPPS SUBSEQUE*    02/27/2023  Level of Service: [place] ANNUAL WELLNESS VISIT-INCLUDES PPPS SUBSEQUE*    06/10/2022  Level of Service: [place] ANNUAL WELLNESS VISIT-INCLUDES PPPS SUBSEQUE*    01/31/2020  Subsequent Annual Wellness Visit - Includes PPPS ()    01/31/2020  Visit Dx: Medicare annual wellness visit, subsequent    Only the first 5 history entries have been loaded, but more history exists.              Colorectal Cancer Screening (Colonoscopy - Every 5 Years) Next due on 3/2/2025      03/02/2020  REFERRAL TO GI FOR COLONOSCOPY    04/26/2019  OCCULT BLOOD FECES IMMUNOASSAY    04/02/2009  REFERRAL TO GI  FOR COLONOSCOPY              IMM DTaP/Tdap/Td Vaccine (3 - Td or Tdap) Next due on 10/17/2032      10/17/2022  Imm Admin: Tdap Vaccine    02/25/2013  Imm Admin: Tdap Vaccine              Hepatitis C Screening  Completed      09/07/2017  Hepatitis C Antibody component of HEPATITIS PANEL ACUTE(4 COMPONENTS)              Abdominal Aortic Aneurysm (AAA) Screening  Completed      12/01/2017  US-AORTA              Pneumococcal Vaccine: 65+ Years (Series Information) Completed      09/06/2019  Imm Admin: Pneumococcal polysaccharide vaccine (PPSV-23)    08/15/2018  Imm Admin: Pneumococcal Conjugate Vaccine (Prevnar/PCV-13)    09/14/2017  Imm Admin: Pneumococcal Conjugate Vaccine (Prevnar/PCV-13)              Zoster (Shingles) Vaccines (Series Information) Completed      03/06/2020  Imm Admin: Zoster Vaccine Recombinant (RZV) (SHINGRIX)    11/21/2019  Imm Admin: Zoster Vaccine Recombinant (RZV) (SHINGRIX)              Influenza Vaccine (Series Information) Completed      09/06/2023  Imm Admin: Influenza Vaccine, Quadrivalent, Adjuvanted (Pf)    10/12/2022  Imm Admin: Influenza Vaccine, Quadrivalent, Adjuvanted (Pf)    10/04/2021  Imm Admin: Influenza Vaccine Adult HD    09/15/2020  Imm Admin: Influenza Vaccine Adult HD    09/06/2019  Imm Admin: Influenza, Unspecified - HISTORICAL DATA    Only the first 5 history entries have been loaded, but more history exists.              COVID-19 Vaccine (Series Information) Completed      10/09/2023  Imm Admin: Comirnaty (Covid-19 Vaccine, Mrna, [id number] Formula)    10/12/2022  Imm Admin: PFIZER BIVALENT SARS-COV-2 VACCINE (12+)    11/01/2021  Imm Admin: MODERNA SARS-COV-2 VACCINE (12+)    03/11/2021  Imm Admin: Agrican SARS-CoV-2 Vaccine              Hepatitis A Vaccine (Hep A) (Series Information) Aged Out      No completion history exists for this topic.              HPV Vaccines (Series Information) Aged Out      No completion history exists for this topic.              Polio  "Vaccine (Inactivated Polio) (Series Information) Aged Out      No completion history exists for this topic.              Meningococcal Immunization (Series Information) Aged Out      No completion history exists for this topic.                    Patient Care Team:  ANABEL Pappas as PCP - General (Nurse Practitioner Family)  Nicko Ferreira M.D. as PCP - [facility] Paneled  Srini Malone M.D. (Ophthalmology)      Financial Resource Strain: Low Risk  (1/18/2024)    Overall Financial Resource Strain (CARDIA)     Difficulty of Paying Living Expenses: Not hard at all      Transportation Needs: No Transportation Needs (1/18/2024)    PRAPARE - Transportation     Lack of Transportation (Medical): No     Lack of Transportation (Non-Medical): No      Food Insecurity: No Food Insecurity (1/18/2024)    Hunger Vital Sign     Worried About Running Out of Food in the Last Year: Never true     Ran Out of Food in the Last Year: Never true        Encounter Vitals  Blood Pressure : 122/78  Weight: 63 kg (139 lb)  Height: 158.8 cm (5' 2.5\")  BMI (Calculated): 25.02  Pain Score: 2=Minimal-Slight     Alert, oriented in no acute distress.  Eye contact is good, speech goal directed, affect calm.    Assessment and Plan. The following treatment and monitoring plan is recommended:    Type 2 diabetes mellitus with diabetic polyneuropathy (HCC)  Chronic, stable. Most recent hemoglobin A1C was 6.7 in August 2023. Currently taking metformin 500 mg four times daily. Reports ongoing neuropathy to hands, toes, and feet. Provided education on removing hazards from home including rugs and carpets, adequate lighting, and use of treaded slippers and socks.      Type 2 diabetes mellitus with hyperlipidemia (HCC)  Chronic, stable. Currently taking pravastatin 40 mg daily. Reports that he is not exercising regularly, but is active. Offered [employer] gym resources. Provided education on heart healthy diet including adequate intake " of fruits, vegetables, and whole grains. Denies chest pain, claudication, and dizziness.    Hypertension associated with type 2 diabetes mellitus (HCC)  Chronic, stable. Currently taking lisinopril-hydrochlorothiazide 20-12.5 mg daily. In-office blood pressure is 122/78. Denies palpitations, lower extremity edema, and shortness of breath.     Primary osteoarthritis of hand, bilateral  Chronic, ongoing. Reports osteoarthritis to bilateral hands. Well-controlled with voltaren gel as needed.  Followed by primary care provider.      Services suggested: No services needed at this time  Health Care Screening: Age-appropriate preventive services recommended by USPTF and ACIP covered by Medicare were discussed today. Services ordered if indicated and agreed upon by the patient.  Referrals offered: Community-based lifestyle interventions to reduce health risks and promote self-management and wellness, fall prevention, nutrition, physical activity, tobacco-use cessation, weight loss, and mental health services as per orders if indicated.    Discussion today about general wellness and lifestyle habits:    Prevent falls and reduce trip hazards; Cautioned about securing or removing rugs.  Have a working fire alarm and carbon monoxide detector.  Engage in regular physical activity and social activities.    Follow-up: Return for appointment with Primary Care Provider as needed.

## 2024-02-14 ENCOUNTER — OFFICE VISIT (OUTPATIENT)
Dept: URGENT CARE | Facility: PHYSICIAN GROUP | Age: 74
End: 2024-02-14
Payer: MEDICARE

## 2024-02-14 VITALS
HEART RATE: 96 BPM | OXYGEN SATURATION: 95 % | DIASTOLIC BLOOD PRESSURE: 60 MMHG | SYSTOLIC BLOOD PRESSURE: 110 MMHG | WEIGHT: 139 LBS | TEMPERATURE: 97.6 F | HEIGHT: 63 IN | RESPIRATION RATE: 16 BRPM | BODY MASS INDEX: 24.63 KG/M2

## 2024-02-14 DIAGNOSIS — J01.10 ACUTE NON-RECURRENT FRONTAL SINUSITIS: ICD-10-CM

## 2024-02-14 DIAGNOSIS — R05.1 ACUTE COUGH: ICD-10-CM

## 2024-02-14 DIAGNOSIS — R09.81 NASAL CONGESTION: ICD-10-CM

## 2024-02-14 LAB
FLUAV RNA SPEC QL NAA+PROBE: NEGATIVE
FLUBV RNA SPEC QL NAA+PROBE: NEGATIVE
RSV RNA SPEC QL NAA+PROBE: NEGATIVE
SARS-COV-2 RNA RESP QL NAA+PROBE: NEGATIVE

## 2024-02-14 PROCEDURE — 0241U POCT CEPHEID COV-2, FLU A/B, RSV - PCR: CPT

## 2024-02-14 PROCEDURE — 3078F DIAST BP <80 MM HG: CPT

## 2024-02-14 PROCEDURE — 99213 OFFICE O/P EST LOW 20 MIN: CPT

## 2024-02-14 PROCEDURE — 3074F SYST BP LT 130 MM HG: CPT

## 2024-02-14 RX ORDER — BENZONATATE 100 MG/1
100 CAPSULE ORAL 3 TIMES DAILY PRN
Qty: 30 CAPSULE | Refills: 0 | Status: SHIPPED | OUTPATIENT
Start: 2024-02-14

## 2024-02-14 RX ORDER — AMOXICILLIN AND CLAVULANATE POTASSIUM 875; 125 MG/1; MG/1
1 TABLET, FILM COATED ORAL 2 TIMES DAILY
Qty: 10 TABLET | Refills: 0 | Status: SHIPPED | OUTPATIENT
Start: 2024-02-14 | End: 2024-02-19

## 2024-02-14 ASSESSMENT — ENCOUNTER SYMPTOMS
DIZZINESS: 0
SHORTNESS OF BREATH: 1
SINUS PAIN: 1
ABDOMINAL PAIN: 0
VOMITING: 0
CHILLS: 1
COUGH: 1
NAUSEA: 0
HEADACHES: 0
MYALGIAS: 0
SORE THROAT: 1
FEVER: 1

## 2024-02-14 ASSESSMENT — FIBROSIS 4 INDEX: FIB4 SCORE: 1.03

## 2024-02-14 NOTE — PROGRESS NOTES
Chief Complaint   Patient presents with    Sinusitis     Chest congestion, sinus pain, right side of face is sore, x5 days        HISTORY OF PRESENT ILLNESS: Patient is a pleasant 73 y.o. male who presents to urgent care today sinus pain and pressure for the last 5 days with a slight cough.  Denies any fevers, no major shortness of breath.  Taking OTC medication with little to no relief.    Patient Active Problem List    Diagnosis Date Noted    Type 2 diabetes mellitus with diabetic polyneuropathy ([facility]) 01/18/2024    Hypertension associated with type 2 diabetes mellitus ([facility]) 07/23/2018    Type 2 diabetes mellitus with hyperlipidemia ([facility]) 07/23/2018    Primary osteoarthritis of hand 07/23/2018       Allergies:Atorvastatin, Bactine, Keflex, and Simvastatin    Current Outpatient Medications Ordered in Epic   Medication Sig Dispense Refill    benzonatate (TESSALON) 100 MG Cap Take 1 Capsule by mouth 3 times a day as needed for Cough. 30 Capsule 0    amoxicillin-clavulanate (AUGMENTIN) 875-125 MG Tab Take 1 Tablet by mouth 2 times a day for 5 days. 10 Tablet 0    metFORMIN (GLUCOPHAGE) 500 MG Tab Take 1 Tablet by mouth 4 times a day. 400 Tablet 3    pravastatin (PRAVACHOL) 40 MG tablet Take 1 Tablet by mouth every day. 100 Tablet 3    lisinopril-hydrochlorothiazide (PRINZIDE) 20-12.5 MG per tablet Take 1 Tablet by mouth every day. 100 Tablet 3     No current Epic-ordered facility-administered medications on file.       Past Medical History:   Diagnosis Date    Hypercalcemia 01/26/2020    Hypercalcemia 01/26/2020    Hyperlipidemia 07/23/2018       Social History     Tobacco Use    Smoking status: Former     Types: Cigarettes    Smokeless tobacco: Never   Vaping Use    Vaping Use: Never used   Substance Use Topics    Alcohol use: Yes     Comment: about 1 drink/week    Drug use: No       No family status information on file.   History reviewed. No pertinent family history.    Review of Systems   Constitutional:  Positive  "for chills, fever and malaise/fatigue.   HENT:  Positive for congestion, sinus pain and sore throat.    Respiratory:  Positive for cough and shortness of breath.    Gastrointestinal:  Negative for abdominal pain, nausea and vomiting.   Musculoskeletal:  Negative for myalgias.   Neurological:  Negative for dizziness and headaches.       Exam:  /60 (BP Location: Left arm, Patient Position: Sitting, BP Cuff Size: Adult)   Pulse 96   Temp 36.4 °C (97.6 °F) (Temporal)   Resp 16   Ht 1.588 m (5' 2.5\")   Wt 63 kg (139 lb)   SpO2 95%   Physical Exam  Vitals reviewed.   Constitutional:       General: He is not in acute distress.     Appearance: Normal appearance.   HENT:      Head: Normocephalic.      Right Ear: Tympanic membrane and ear canal normal. There is no impacted cerumen. Tympanic membrane is not injected or erythematous.      Left Ear: Tympanic membrane and ear canal normal. There is no impacted cerumen. Tympanic membrane is not injected or erythematous.      Nose: Congestion and rhinorrhea present. Rhinorrhea is clear.      Mouth/Throat:      Mouth: Mucous membranes are moist.      Pharynx: Oropharynx is clear. Posterior oropharyngeal erythema present. No oropharyngeal exudate.      Tonsils: No tonsillar exudate. 0 on the right. 0 on the left.   Eyes:      General:         Right eye: No discharge.         Left eye: No discharge.      Extraocular Movements: Extraocular movements intact.      Conjunctiva/sclera: Conjunctivae normal.      Pupils: Pupils are equal, round, and reactive to light.   Cardiovascular:      Rate and Rhythm: Normal rate and regular rhythm.      Pulses: Normal pulses.      Heart sounds: Normal heart sounds. No murmur heard.  Pulmonary:      Effort: Pulmonary effort is normal. No respiratory distress.      Breath sounds: Normal breath sounds. No stridor. No wheezing or rhonchi.      Comments: Positive dry cough  Chest:      Chest wall: No tenderness.   Musculoskeletal:         " General: No swelling, tenderness, deformity or signs of injury. Normal range of motion.      Cervical back: Normal range of motion.      Right lower leg: No edema.      Left lower leg: No edema.   Lymphadenopathy:      Cervical: No cervical adenopathy.   Skin:     General: Skin is warm and dry.      Findings: No bruising or rash.   Neurological:      General: No focal deficit present.      Mental Status: He is alert.      Sensory: No sensory deficit.      Motor: No weakness.      Coordination: Coordination normal.      Gait: Gait normal.   Psychiatric:         Mood and Affect: Mood normal.         Behavior: Behavior normal.         Thought Content: Thought content normal.         Judgment: Judgment normal.         Assessment/Plan:  1. Acute non-recurrent frontal sinusitis  - amoxicillin-clavulanate (AUGMENTIN) 875-125 MG Tab; Take 1 Tablet by mouth 2 times a day for 5 days.  Dispense: 10 Tablet; Refill: 0    2. Nasal congestion  - POCT CoV-2, Flu A/B, RSV by PCR    3. Acute cough  - POCT CoV-2, Flu A/B, RSV by PCR  - benzonatate (TESSALON) 100 MG Cap; Take 1 Capsule by mouth 3 times a day as needed for Cough.  Dispense: 30 Capsule; Refill: 0    Based on physical exam along with review of systems I did go ahead and place patient on Augmentin and Tessalon Perles for his cough.  Patient is allergic to Flonase.  Advised Claritin or Zyrtec to help dry up his nose.  Patient encouraged to take medication with food, drink plenty of fluids.  He tested negative for COVID.  Patient notified of plan of care via phone.  Patient is aware of the plan of care and agreeable this time.  He has taken amoxicillin in the past with no issues.    Supportive care, differential diagnoses, and indications for immediate follow-up discussed with patient.   Pathogenesis of diagnosis discussed including typical length and natural progression.   Instructed to return to clinic or nearest emergency department for any change in condition, further  concerns, or worsening of symptoms.  Patient states understanding of the plan of care and discharge instructions.  Instructed to make an appointment, for follow up, with primary care provider.      Please note that this dictation was created using voice recognition software. I have made every reasonable attempt to correct obvious errors, but I expect that there are errors of grammar and possibly content that I did not discover before finalizing the note.      Honey GARCIA

## 2024-04-22 ENCOUNTER — OFFICE VISIT (OUTPATIENT)
Dept: MEDICAL GROUP | Facility: MEDICAL CENTER | Age: 74
End: 2024-04-22
Payer: MEDICARE

## 2024-04-22 VITALS
DIASTOLIC BLOOD PRESSURE: 74 MMHG | TEMPERATURE: 97.6 F | BODY MASS INDEX: 25.16 KG/M2 | HEART RATE: 68 BPM | RESPIRATION RATE: 16 BRPM | WEIGHT: 142 LBS | HEIGHT: 63 IN | SYSTOLIC BLOOD PRESSURE: 124 MMHG | OXYGEN SATURATION: 98 %

## 2024-04-22 DIAGNOSIS — I10 PRIMARY HYPERTENSION: ICD-10-CM

## 2024-04-22 DIAGNOSIS — Z12.5 ENCOUNTER FOR SCREENING FOR MALIGNANT NEOPLASM OF PROSTATE: ICD-10-CM

## 2024-04-22 DIAGNOSIS — E11.42 TYPE 2 DIABETES MELLITUS WITH DIABETIC POLYNEUROPATHY, WITHOUT LONG-TERM CURRENT USE OF INSULIN (HCC): ICD-10-CM

## 2024-04-22 DIAGNOSIS — E78.5 HYPERLIPIDEMIA, UNSPECIFIED HYPERLIPIDEMIA TYPE: ICD-10-CM

## 2024-04-22 DIAGNOSIS — K05.219 PERIODONTAL ABSCESS: ICD-10-CM

## 2024-04-22 LAB
HBA1C MFR BLD: 7.1 % (ref ?–5.8)
POCT INT CON NEG: NEGATIVE
POCT INT CON POS: POSITIVE

## 2024-04-22 PROCEDURE — 99214 OFFICE O/P EST MOD 30 MIN: CPT | Performed by: NURSE PRACTITIONER

## 2024-04-22 PROCEDURE — 3078F DIAST BP <80 MM HG: CPT | Performed by: NURSE PRACTITIONER

## 2024-04-22 PROCEDURE — 3074F SYST BP LT 130 MM HG: CPT | Performed by: NURSE PRACTITIONER

## 2024-04-22 PROCEDURE — 83036 HEMOGLOBIN GLYCOSYLATED A1C: CPT | Performed by: NURSE PRACTITIONER

## 2024-04-22 RX ORDER — PRAVASTATIN SODIUM 40 MG
40 TABLET ORAL
Qty: 100 TABLET | Refills: 3 | Status: SHIPPED | OUTPATIENT
Start: 2024-04-22

## 2024-04-22 RX ORDER — LISINOPRIL AND HYDROCHLOROTHIAZIDE 20; 12.5 MG/1; MG/1
1 TABLET ORAL DAILY
Qty: 100 TABLET | Refills: 3 | Status: SHIPPED | OUTPATIENT
Start: 2024-04-22

## 2024-04-22 RX ORDER — AMOXICILLIN AND CLAVULANATE POTASSIUM 875; 125 MG/1; MG/1
1 TABLET, FILM COATED ORAL 2 TIMES DAILY
Qty: 14 TABLET | Refills: 0 | Status: SHIPPED | OUTPATIENT
Start: 2024-04-22 | End: 2024-04-29

## 2024-04-22 ASSESSMENT — FIBROSIS 4 INDEX: FIB4 SCORE: 1.03

## 2024-04-22 NOTE — PROGRESS NOTES
"Subjective:     HPI:     Sveta Knox is a 73 y.o. male presents to discuss:   Chief Complaint   Patient presents with    Diabetes Follow-up     Lab Results   Component Value Date/Time    HBA1C 7.1 (A) 04/22/2024 0659    HBA1C 6.7 (A) 08/22/2023 0704    HBA1C 7.6 (A) 02/13/2023 0708    HBA1C 6.6 (A) 08/05/2022 0703     Feels like he has an infection in gums-right lower lateral aspect, has receding gums. Can push out pus and taste it.   No fevers. Started about 1 week ago.     ROS: : see above      Current Outpatient Medications:     pravastatin (PRAVACHOL) 40 MG tablet, Take 1 Tablet by mouth every day., Disp: 100 Tablet, Rfl: 3    metFORMIN (GLUCOPHAGE) 500 MG Tab, Take 1 Tablet by mouth 4 times a day., Disp: 400 Tablet, Rfl: 3    lisinopril-hydrochlorothiazide (PRINZIDE) 20-12.5 MG per tablet, Take 1 Tablet by mouth every day., Disp: 100 Tablet, Rfl: 3    amoxicillin-clavulanate (AUGMENTIN) 875-125 MG Tab, Take 1 Tablet by mouth 2 times a day for 7 days., Disp: 14 Tablet, Rfl: 0    Allergies   Allergen Reactions    Atorvastatin      Left sided weakness    Bactine     Keflex     Simvastatin      Left sided weakness       Objective:     Vitals: /74   Pulse 68   Temp 36.4 °C (97.6 °F)   Resp 16   Ht 1.588 m (5' 2.5\")   Wt 64.4 kg (142 lb)   SpO2 98%   BMI 25.56 kg/m²    General: Alert, pleasant, NAD  HEENT: Normocephalic.  Neck supple.   Respiratory: no distress, no audible wheezing, RR -WNL  Heart: Heart rate regular.  No murmur.  Skin: Warm, dry, no rashes.  Extremities: No leg edema. No discoloration  Neurological: No tremors  Psych:  Affect/mood is normal, judgement is good, memory is intact, grooming is appropriate.    Assessment/Plan:      1. Type 2 diabetes mellitus with diabetic polyneuropathy, without long-term current use of insulin (HCC)  Chronic.  A1c 7.1%.  Has been indulging lately.  Monofilament completed.  Continue metformin.  Follow-up 6 months with routine labs.  - Comp " Metabolic Panel; Future  - MICROALBUMIN CREAT RATIO URINE; Future  - POCT Hemoglobin A1C  - Diabetic Monofilament LE Exam  - metFORMIN (GLUCOPHAGE) 500 MG Tab; Take 1 Tablet by mouth 4 times a day.  Dispense: 400 Tablet; Refill: 3    2. Primary hypertension  Chronic stable.  Continue lisinopril/HCTZ.  Denies any chest pain, leg swelling, headache or syncopal episodes.  Update labs prior to next visit.  - Comp Metabolic Panel; Future  - MICROALBUMIN CREAT RATIO URINE; Future  - CBC WITHOUT DIFFERENTIAL; Future  - lisinopril-hydrochlorothiazide (PRINZIDE) 20-12.5 MG per tablet; Take 1 Tablet by mouth every day.  Dispense: 100 Tablet; Refill: 3    3. Hyperlipidemia, unspecified hyperlipidemia type  Chronic.  LDL previous not at goal.  Tolerating pravastatin 40 mg without any side effects.  Update lipid panel.  Follow-up 6 months.  - Comp Metabolic Panel; Future  - Lipid Profile; Future  - pravastatin (PRAVACHOL) 40 MG tablet; Take 1 Tablet by mouth every day.  Dispense: 100 Tablet; Refill: 3    4. Encounter for screening for malignant neoplasm of prostate  - PROSTATE SPECIFIC AG SCREENING; Future    5. Periodontal abscess  Acute problem.  Start on Augmentin.  Recommend saline rinses.  Follow-up if symptoms persist or worsen.  - amoxicillin-clavulanate (AUGMENTIN) 875-125 MG Tab; Take 1 Tablet by mouth 2 times a day for 7 days.  Dispense: 14 Tablet; Refill: 0       No problem-specific Assessment & Plan notes found for this encounter.         Return in about 6 months (around 10/22/2024) for Diabetes, Lab results.    {I have placed the above orders and discussed them with an approved delegating provider. The MA is performing the below orders under the direction of Dr. Juve LITTLE

## 2024-04-26 NOTE — TELEPHONE ENCOUNTER
Was the patient seen in the last year in this department? Yes    Does patient have an active prescription for medications requested? No     Received Request Via: Pharmacy  
[phone]

## 2024-09-16 ENCOUNTER — OFFICE VISIT (OUTPATIENT)
Dept: URGENT CARE | Facility: PHYSICIAN GROUP | Age: 74
End: 2024-09-16
Payer: MEDICARE

## 2024-09-16 VITALS
BODY MASS INDEX: 25.98 KG/M2 | SYSTOLIC BLOOD PRESSURE: 100 MMHG | TEMPERATURE: 97.4 F | WEIGHT: 146.6 LBS | HEART RATE: 87 BPM | RESPIRATION RATE: 16 BRPM | DIASTOLIC BLOOD PRESSURE: 56 MMHG | HEIGHT: 63 IN | OXYGEN SATURATION: 97 %

## 2024-09-16 DIAGNOSIS — L02.31 CELLULITIS AND ABSCESS OF BUTTOCK: Primary | ICD-10-CM

## 2024-09-16 DIAGNOSIS — L03.317 CELLULITIS AND ABSCESS OF BUTTOCK: Primary | ICD-10-CM

## 2024-09-16 PROCEDURE — 3074F SYST BP LT 130 MM HG: CPT | Performed by: PHYSICIAN ASSISTANT

## 2024-09-16 PROCEDURE — 3078F DIAST BP <80 MM HG: CPT | Performed by: PHYSICIAN ASSISTANT

## 2024-09-16 PROCEDURE — 99213 OFFICE O/P EST LOW 20 MIN: CPT | Performed by: PHYSICIAN ASSISTANT

## 2024-09-16 RX ORDER — CLINDAMYCIN HCL 150 MG
CAPSULE ORAL
COMMUNITY
Start: 2024-07-29 | End: 2024-09-16

## 2024-09-16 RX ORDER — SULFAMETHOXAZOLE/TRIMETHOPRIM 800-160 MG
1 TABLET ORAL 2 TIMES DAILY
Qty: 10 TABLET | Refills: 0 | Status: SHIPPED | OUTPATIENT
Start: 2024-09-16 | End: 2024-09-21

## 2024-09-16 ASSESSMENT — FIBROSIS 4 INDEX: FIB4 SCORE: 1.05

## 2024-09-16 NOTE — PROGRESS NOTES
"Subjective:   Sveta Knox is a 74 y.o. male who presents for Abscess (Gluteal area, x 4 days)      HPI  The patient presents to the Urgent Care with complaints of a possible abscess to left gluteus area he noticed 4 days ago.  He has not had this before.  There is some tenderness to the area.  Area is growing in size.  He noticed spontaneous drainage described as weeping.  No fevers or chills.  No other complaints or concerns.          Past Medical History:   Diagnosis Date    Hypercalcemia 01/26/2020    Hypercalcemia 01/26/2020    Hyperlipidemia 07/23/2018     Allergies   Allergen Reactions    Atorvastatin      Left sided weakness    Bactine     Keflex     Simvastatin      Left sided weakness        Objective:     /56 (BP Location: Right arm, Patient Position: Sitting, BP Cuff Size: Adult)   Pulse 87   Temp 36.3 °C (97.4 °F) (Temporal)   Resp 16   Ht 1.6 m (5' 3\")   Wt 66.5 kg (146 lb 9.6 oz)   SpO2 97%   BMI 25.97 kg/m²     Physical Exam  Vitals reviewed.   Constitutional:       General: He is not in acute distress.     Appearance: Normal appearance. He is not ill-appearing or toxic-appearing.   Eyes:      Conjunctiva/sclera: Conjunctivae normal.   Cardiovascular:      Rate and Rhythm: Normal rate.   Pulmonary:      Effort: Pulmonary effort is normal.   Musculoskeletal:      Cervical back: Neck supple.   Skin:     General: Skin is warm and dry.             Comments: Left gluteus near gluteal cleft there is a small 0.5 cm abscess with mild surrounding erythema and induration. Small active purulent drainage. No fluctuance. Positive tenderness.    Neurological:      General: No focal deficit present.      Mental Status: He is alert and oriented to person, place, and time.   Psychiatric:         Mood and Affect: Mood normal.         Behavior: Behavior normal.         Diagnosis and associated orders:     1. Cellulitis and abscess of buttock  - sulfamethoxazole-trimethoprim (BACTRIM DS) 800-160 " MG tablet; Take 1 Tablet by mouth 2 times a day for 5 days.  Dispense: 10 Tablet; Refill: 0       Comments/MDM:     Does not appear to be a pilonidal cyst. No indication for I&D at this time. Was able to manually express small amount of purulent discharge. Placed gauze over the area.   Start Bactrim.  Warm compresses.  Warm Epsom salt soaks.  Ibuprofen for pain.  If no improvement in 3 to 5 days or any worsening return to the urgent care for reevaluation.       I personally reviewed prior external notes and test results pertinent to today's visit. Pathogenesis of diagnosis discussed including typical length and natural progression. Supportive care, natural history, differential diagnoses, and indications for immediate follow-up discussed. Patient expresses understanding and agrees to plan. Patient denies any other questions or concerns.     Follow-up with the primary care physician for recheck, reevaluation, and consideration of further management.    Please note that this dictation was created using voice recognition software. I have made a reasonable attempt to correct obvious errors, but I expect that there are errors of grammar and possibly content that I did not discover before finalizing the note.    This note was electronically signed by Bert Flores PA-C

## 2024-10-15 ENCOUNTER — HOSPITAL ENCOUNTER (OUTPATIENT)
Dept: LAB | Facility: MEDICAL CENTER | Age: 74
End: 2024-10-15
Attending: NURSE PRACTITIONER
Payer: MEDICARE

## 2024-10-15 DIAGNOSIS — Z12.5 ENCOUNTER FOR SCREENING FOR MALIGNANT NEOPLASM OF PROSTATE: ICD-10-CM

## 2024-10-15 DIAGNOSIS — E11.42 TYPE 2 DIABETES MELLITUS WITH DIABETIC POLYNEUROPATHY, WITHOUT LONG-TERM CURRENT USE OF INSULIN (HCC): ICD-10-CM

## 2024-10-15 DIAGNOSIS — E78.5 HYPERLIPIDEMIA, UNSPECIFIED HYPERLIPIDEMIA TYPE: ICD-10-CM

## 2024-10-15 DIAGNOSIS — I10 PRIMARY HYPERTENSION: ICD-10-CM

## 2024-10-15 LAB
ALBUMIN SERPL BCP-MCNC: 4.6 G/DL (ref 3.2–4.9)
ALBUMIN/GLOB SERPL: 1.8 G/DL
ALP SERPL-CCNC: 39 U/L (ref 30–99)
ALT SERPL-CCNC: 17 U/L (ref 2–50)
ANION GAP SERPL CALC-SCNC: 10 MMOL/L (ref 7–16)
AST SERPL-CCNC: 18 U/L (ref 12–45)
BILIRUB SERPL-MCNC: 0.5 MG/DL (ref 0.1–1.5)
BUN SERPL-MCNC: 15 MG/DL (ref 8–22)
CALCIUM ALBUM COR SERPL-MCNC: 9.7 MG/DL (ref 8.5–10.5)
CALCIUM SERPL-MCNC: 10.2 MG/DL (ref 8.5–10.5)
CHLORIDE SERPL-SCNC: 102 MMOL/L (ref 96–112)
CHOLEST SERPL-MCNC: 204 MG/DL (ref 100–199)
CO2 SERPL-SCNC: 28 MMOL/L (ref 20–33)
CREAT SERPL-MCNC: 1.06 MG/DL (ref 0.5–1.4)
CREAT UR-MCNC: 51.6 MG/DL
ERYTHROCYTE [DISTWIDTH] IN BLOOD BY AUTOMATED COUNT: 44.1 FL (ref 35.9–50)
GFR SERPLBLD CREATININE-BSD FMLA CKD-EPI: 74 ML/MIN/1.73 M 2
GLOBULIN SER CALC-MCNC: 2.6 G/DL (ref 1.9–3.5)
GLUCOSE SERPL-MCNC: 134 MG/DL (ref 65–99)
HCT VFR BLD AUTO: 43.6 % (ref 42–52)
HDLC SERPL-MCNC: 67 MG/DL
HGB BLD-MCNC: 14.9 G/DL (ref 14–18)
LDLC SERPL CALC-MCNC: 124 MG/DL
MCH RBC QN AUTO: 31.8 PG (ref 27–33)
MCHC RBC AUTO-ENTMCNC: 34.2 G/DL (ref 32.3–36.5)
MCV RBC AUTO: 93.2 FL (ref 81.4–97.8)
MICROALBUMIN UR-MCNC: <1.2 MG/DL
MICROALBUMIN/CREAT UR: NORMAL MG/G (ref 0–30)
PLATELET # BLD AUTO: 238 K/UL (ref 164–446)
PMV BLD AUTO: 9.4 FL (ref 9–12.9)
POTASSIUM SERPL-SCNC: 4.9 MMOL/L (ref 3.6–5.5)
PROT SERPL-MCNC: 7.2 G/DL (ref 6–8.2)
RBC # BLD AUTO: 4.68 M/UL (ref 4.7–6.1)
SODIUM SERPL-SCNC: 140 MMOL/L (ref 135–145)
TRIGL SERPL-MCNC: 65 MG/DL (ref 0–149)
WBC # BLD AUTO: 4.7 K/UL (ref 4.8–10.8)

## 2024-10-15 PROCEDURE — 84153 ASSAY OF PSA TOTAL: CPT

## 2024-10-15 PROCEDURE — 82043 UR ALBUMIN QUANTITATIVE: CPT

## 2024-10-15 PROCEDURE — 82570 ASSAY OF URINE CREATININE: CPT

## 2024-10-15 PROCEDURE — 36415 COLL VENOUS BLD VENIPUNCTURE: CPT

## 2024-10-15 PROCEDURE — 85027 COMPLETE CBC AUTOMATED: CPT

## 2024-10-15 PROCEDURE — 80061 LIPID PANEL: CPT

## 2024-10-15 PROCEDURE — 80053 COMPREHEN METABOLIC PANEL: CPT

## 2024-10-16 LAB — PSA SERPL-MCNC: 2.46 NG/ML (ref 0–4)

## 2024-10-23 ENCOUNTER — OFFICE VISIT (OUTPATIENT)
Dept: MEDICAL GROUP | Facility: MEDICAL CENTER | Age: 74
End: 2024-10-23
Payer: MEDICARE

## 2024-10-23 VITALS
TEMPERATURE: 97.6 F | HEART RATE: 62 BPM | WEIGHT: 141 LBS | OXYGEN SATURATION: 97 % | RESPIRATION RATE: 18 BRPM | HEIGHT: 63 IN | DIASTOLIC BLOOD PRESSURE: 70 MMHG | BODY MASS INDEX: 24.98 KG/M2 | SYSTOLIC BLOOD PRESSURE: 114 MMHG

## 2024-10-23 DIAGNOSIS — I10 PRIMARY HYPERTENSION: ICD-10-CM

## 2024-10-23 DIAGNOSIS — Z80.8 FAMILY HISTORY OF MELANOMA: ICD-10-CM

## 2024-10-23 DIAGNOSIS — E78.5 HYPERLIPIDEMIA, UNSPECIFIED HYPERLIPIDEMIA TYPE: ICD-10-CM

## 2024-10-23 DIAGNOSIS — D48.9 NEOPLASM OF UNCERTAIN BEHAVIOR: ICD-10-CM

## 2024-10-23 DIAGNOSIS — E11.42 TYPE 2 DIABETES MELLITUS WITH DIABETIC POLYNEUROPATHY, WITHOUT LONG-TERM CURRENT USE OF INSULIN (HCC): ICD-10-CM

## 2024-10-23 DIAGNOSIS — Z87.891 HISTORY OF TOBACCO USE: ICD-10-CM

## 2024-10-23 LAB
HBA1C MFR BLD: 6.6 % (ref ?–5.8)
POCT INT CON NEG: NEGATIVE
POCT INT CON POS: POSITIVE

## 2024-10-23 PROCEDURE — 99214 OFFICE O/P EST MOD 30 MIN: CPT | Performed by: NURSE PRACTITIONER

## 2024-10-23 PROCEDURE — 83036 HEMOGLOBIN GLYCOSYLATED A1C: CPT | Performed by: NURSE PRACTITIONER

## 2024-10-23 PROCEDURE — 3078F DIAST BP <80 MM HG: CPT | Performed by: NURSE PRACTITIONER

## 2024-10-23 PROCEDURE — 3074F SYST BP LT 130 MM HG: CPT | Performed by: NURSE PRACTITIONER

## 2024-10-23 ASSESSMENT — FIBROSIS 4 INDEX: FIB4 SCORE: 1.32

## 2024-11-04 ENCOUNTER — HOSPITAL ENCOUNTER (OUTPATIENT)
Dept: RADIOLOGY | Facility: MEDICAL CENTER | Age: 74
End: 2024-11-04
Attending: NURSE PRACTITIONER
Payer: MEDICARE

## 2024-11-04 DIAGNOSIS — Z87.891 HISTORY OF TOBACCO USE: ICD-10-CM

## 2024-11-04 PROCEDURE — 71250 CT THORAX DX C-: CPT

## 2024-11-07 DIAGNOSIS — I71.21 ANEURYSM OF ASCENDING AORTA WITHOUT RUPTURE (HCC): ICD-10-CM

## 2024-11-12 ENCOUNTER — TELEPHONE (OUTPATIENT)
Dept: VASCULAR LAB | Facility: MEDICAL CENTER | Age: 74
End: 2024-11-12
Payer: MEDICARE

## 2024-11-12 NOTE — TELEPHONE ENCOUNTER
Spoke to patient in regarding NP appointment.    Any recent testing (labs or imaging) outside of [facility]?  No    Were any records requested?  No    New patient packet sent via [facility] or mail?  No, pt able to verbalize where we are located.     Is appointment virtual? No    Augusto Sparks, PharmD

## 2024-11-14 ENCOUNTER — OFFICE VISIT (OUTPATIENT)
Dept: VASCULAR LAB | Facility: MEDICAL CENTER | Age: 74
End: 2024-11-14
Attending: FAMILY MEDICINE
Payer: MEDICARE

## 2024-11-14 VITALS
DIASTOLIC BLOOD PRESSURE: 73 MMHG | HEIGHT: 63 IN | HEART RATE: 72 BPM | SYSTOLIC BLOOD PRESSURE: 111 MMHG | BODY MASS INDEX: 25.69 KG/M2 | WEIGHT: 145 LBS

## 2024-11-14 DIAGNOSIS — I77.810 ASCENDING AORTA DILATION (HCC): Chronic | ICD-10-CM

## 2024-11-14 DIAGNOSIS — I15.2 HYPERTENSION ASSOCIATED WITH TYPE 2 DIABETES MELLITUS (HCC): ICD-10-CM

## 2024-11-14 DIAGNOSIS — E11.42 TYPE 2 DIABETES MELLITUS WITH DIABETIC POLYNEUROPATHY, WITHOUT LONG-TERM CURRENT USE OF INSULIN (HCC): ICD-10-CM

## 2024-11-14 DIAGNOSIS — E11.59 HYPERTENSION ASSOCIATED WITH TYPE 2 DIABETES MELLITUS (HCC): ICD-10-CM

## 2024-11-14 DIAGNOSIS — E78.5 DYSLIPIDEMIA: Chronic | ICD-10-CM

## 2024-11-14 DIAGNOSIS — I70.0 AORTO-ILIAC ATHEROSCLEROSIS (HCC): Chronic | ICD-10-CM

## 2024-11-14 DIAGNOSIS — I72.3 ANEURYSM OF RIGHT ILIAC ARTERY (HCC): Chronic | ICD-10-CM

## 2024-11-14 DIAGNOSIS — I70.8 AORTO-ILIAC ATHEROSCLEROSIS (HCC): Chronic | ICD-10-CM

## 2024-11-14 DIAGNOSIS — I25.10 CORONARY ARTERY CALCIFICATION SEEN ON CT SCAN: ICD-10-CM

## 2024-11-14 PROCEDURE — 99204 OFFICE O/P NEW MOD 45 MIN: CPT | Performed by: FAMILY MEDICINE

## 2024-11-14 PROCEDURE — 3078F DIAST BP <80 MM HG: CPT | Performed by: FAMILY MEDICINE

## 2024-11-14 PROCEDURE — G2211 COMPLEX E/M VISIT ADD ON: HCPCS | Performed by: FAMILY MEDICINE

## 2024-11-14 PROCEDURE — 3074F SYST BP LT 130 MM HG: CPT | Performed by: FAMILY MEDICINE

## 2024-11-14 PROCEDURE — 99212 OFFICE O/P EST SF 10 MIN: CPT

## 2024-11-14 RX ORDER — LOSARTAN POTASSIUM AND HYDROCHLOROTHIAZIDE 12.5; 5 MG/1; MG/1
1 TABLET ORAL DAILY
Qty: 100 TABLET | Refills: 3 | Status: SHIPPED | OUTPATIENT
Start: 2024-11-14

## 2024-11-14 ASSESSMENT — ENCOUNTER SYMPTOMS
PALPITATIONS: 0
PND: 0
SPUTUM PRODUCTION: 0
SHORTNESS OF BREATH: 0
CLAUDICATION: 0
FEVER: 0
CHILLS: 0
ORTHOPNEA: 0
HEMOPTYSIS: 0
COUGH: 0
WHEEZING: 0

## 2024-11-14 ASSESSMENT — FIBROSIS 4 INDEX: FIB4 SCORE: 1.32

## 2024-11-14 NOTE — PROGRESS NOTES
INITIAL VASCULAR MEDICINE CLINIC VISIT  24     Sveta Knox is a 74 y.o. male referred for eval/med mgmt of ascending aortic dilation/aneurysm (AsAA), est 2024  Referring provider: Brandee Walsh*     Subjective      AsAA: Denies current chest, back, abdominal pain, SOB, dysphagia, worsening cough, hemoptysis.  Pertinent pmhx:  Initial visit hx/sx: seen by PCP in 10/2024, had CT chest for lung CA screening in light of hx of tobacco use.    4.0cm, initial noted size on LDCT (non-contrast)  HTN: Yes, Details: on meds see below   Hx of tobacco:   reports that he quit smoking about 37 years ago. His smoking use included cigarettes. He started smoking about 57 years ago. He has a 60 pack-year smoking history. He has never used smokeless tobacco.  Hx of connective tissue d/o (eg. Marfans, David-Danlos, Loeys-Pineda): no  Hx of inflammatory / autoimmune vasculitis (Takayasu's arteritis, Giant Cell arteritis): no   Hx of infective aortitis, HIV, syphilis: no   Hx of MVA, chest wall trauma, deceleration injuries: no   Hx of Biscuspid Aortic Valve:  pending echo   Hx of anabolic steroid use or legal or illicit stimulants: no  Pertinent famhx:  Connective tissue disorders: no   Aneurysmal disease or known hereditary thoracic aneurysmal disease: no    HTN: Stable, tolerating meds with good adherence, Home BP los/70s   HLD: Stable, current treatment: Moderate intensity statin - tolerating, good adherence.   Failed atorva, no other LLT Rx in past     Dysglycemia: yes, T2D, mgmt per PCP   Antithrombotic tx: none - no hx of bleeding      Patient Active Problem List    Diagnosis Date Noted    Dyslipidemia 2024    Ascending aorta dilation (HCC) 2024    Coronary artery calcification seen on CT scan 2024    History of tobacco use 10/23/2024    Family history of melanoma 10/23/2024    Type 2 diabetes mellitus with diabetic polyneuropathy (HCC) 2024    Hypertension associated with  "type 2 diabetes mellitus (HCC) 2018    Type 2 diabetes mellitus with hyperlipidemia (HCC) 2018    Primary osteoarthritis of hand 2018      History reviewed. No pertinent surgical history.   History reviewed. No pertinent family history.   Current Outpatient Medications on File Prior to Visit   Medication Sig Dispense Refill    pravastatin (PRAVACHOL) 40 MG tablet Take 1 Tablet by mouth every day. 100 Tablet 3    metFORMIN (GLUCOPHAGE) 500 MG Tab Take 1 Tablet by mouth 4 times a day. 400 Tablet 3     No current facility-administered medications on file prior to visit.      Allergies   Allergen Reactions    Atorvastatin      Left sided weakness    Bactine     Keflex     Simvastatin      Left sided weakness        Social History     Tobacco Use    Smoking status: Former     Current packs/day: 0.00     Average packs/day: 3.0 packs/day for 20.0 years (60.0 ttl pk-yrs)     Types: Cigarettes     Start date:      Quit date:      Years since quittin.8    Smokeless tobacco: Never   Vaping Use    Vaping status: Never Used   Substance Use Topics    Alcohol use: Not Currently     Comment: about 1 drink/week    Drug use: No     DIET AND EXERCISE:  Weight Change: stable   Diet: common adult  Exercise: moderate regular exercise program     Review of Systems   Constitutional:  Negative for chills, fever and malaise/fatigue.   Respiratory:  Negative for cough, hemoptysis, sputum production, shortness of breath and wheezing.    Cardiovascular:  Negative for chest pain, palpitations, orthopnea, claudication, leg swelling and PND.          Objective    Vitals:    24 0758   BP: 111/73   BP Location: Left arm   Patient Position: Sitting   BP Cuff Size: Large adult   Pulse: 72   Weight: 65.8 kg (145 lb)   Height: 1.6 m (5' 3\")      BP Readings from Last 4 Encounters:   24 111/73   10/23/24 114/70   24 100/56   24 124/74      Body mass index is 25.69 kg/m².   Wt Readings from Last 4 " "Encounters:   11/14/24 65.8 kg (145 lb)   10/23/24 64 kg (141 lb)   09/16/24 66.5 kg (146 lb 9.6 oz)   04/22/24 64.4 kg (142 lb)      Physical Exam  Constitutional:       General: He is not in acute distress.     Appearance: Normal appearance. He is not diaphoretic.   HENT:      Head: Normocephalic and atraumatic.   Eyes:      Conjunctiva/sclera: Conjunctivae normal.   Cardiovascular:      Rate and Rhythm: Normal rate and regular rhythm.      Pulses:           Carotid pulses are 2+ on the right side and 2+ on the left side.       Radial pulses are 2+ on the right side and 2+ on the left side.        Dorsalis pedis pulses are 2+ on the right side and 2+ on the left side.        Posterior tibial pulses are 2+ on the right side and 2+ on the left side.      Heart sounds: Normal heart sounds.   Pulmonary:      Effort: Pulmonary effort is normal.      Breath sounds: Normal breath sounds.   Musculoskeletal:      Right lower leg: No edema.      Left lower leg: No edema.   Skin:     General: Skin is warm and dry.   Neurological:      Mental Status: He is alert and oriented to person, place, and time.      Cranial Nerves: No cranial nerve deficit.      Gait: Gait is intact.   Psychiatric:         Mood and Affect: Mood and affect normal.          DATA REVIEW    Lab Results   Component Value Date/Time    CHOLSTRLTOT 204 (H) 10/15/2024 06:33 AM     (H) 10/15/2024 06:33 AM    HDL 67 10/15/2024 06:33 AM    TRIGLYCERIDE 65 10/15/2024 06:33 AM       Lab Results   Component Value Date/Time     (H) 10/15/2024 06:33 AM     (H) 09/06/2023 06:29 AM    LDL 93 08/31/2022 10:47 AM     (H) 08/02/2021 06:44 AM     (H) 07/16/2020 05:09 PM       No results found for: \"LIPOPROTA\"   No results found for: \"APOB\"   No results found for: \"CRPHIGHSEN\"    Lab Results   Component Value Date/Time    SODIUM 140 10/15/2024 06:33 AM    POTASSIUM 4.9 10/15/2024 06:33 AM    CHLORIDE 102 10/15/2024 06:33 AM    CO2 28 " 10/15/2024 06:33 AM    GLUCOSE 134 (H) 10/15/2024 06:33 AM    BUN 15 10/15/2024 06:33 AM    CREATININE 1.06 10/15/2024 06:33 AM     Lab Results   Component Value Date/Time    ALKPHOSPHAT 39 10/15/2024 06:33 AM    ASTSGOT 18 10/15/2024 06:33 AM    ALTSGPT 17 10/15/2024 06:33 AM    TBILIRUBIN 0.5 10/15/2024 06:33 AM       Lab Results   Component Value Date/Time    HBA1C 6.6 (A) 10/23/2024 07:05 AM       Lab Results   Component Value Date/Time    MALBCRT see below 10/15/2024 06:32 AM    MICROALBUR <1.2 10/15/2024 06:32 AM       Cardiovascular Imagin AAA duplex   There is mild atherosclerosis of the visualized abdominal aorta.   Proximal abdominal aorta measures 2.38 cm x 2.1 cm.   Mid abdominal aorta measures 1.96 cm x 2.1 cm.   Distal abdominal aorta measures 1.62 cm x 1.7 cm.   The right common iliac artery measures 1.46 cm x 1 cm, the left 1.30 cm x 0.9 cm.   No incidental abnormalities in the visualized portions of the retroperitoneum are identified.   IMPRESSION:   Mildly atherosclerotic aorta without aneurysmal dilatation.    2024 LDCT w/o   Cardiac: Heart normal in size There is coronary artery calcification.  Vascular: 4 cm fusiform ascending thoracic aortic aneurysm.  IMPRESSION:   1. No evidence of malignancy.  2. 4 cm ascending thoracic aortic aneurysm.  3. Coronary artery calcifications.        CV PROCEDURES: NONE         Medical Decision Making:  Today's Assessment / Status / Plan:     1. Ascending aorta dilation (HCC)  EC-ECHOCARDIOGRAM COMPLETE W/O CONT    CT-CTA COMPLETE THORACOABDOMINAL AORTA      2. Aneurysm of right iliac artery (HCC)  CT-CTA COMPLETE THORACOABDOMINAL AORTA    CRP HIGH SENSITIVE (CARDIAC)    ectasia only      3. Aorto-iliac atherosclerosis (HCC)  CT-CTA COMPLETE THORACOABDOMINAL AORTA      4. Hypertension associated with type 2 diabetes mellitus (HCC)  MICROALBUMIN CREAT RATIO URINE    losartan-hydrochlorothiazide (HYZAAR) 50-12.5 MG per tablet      5. Type 2 diabetes  "mellitus with diabetic polyneuropathy, without long-term current use of insulin (HCC)  APOLIPOPROTEIN B    Comp Metabolic Panel    Lipoprotein (a)    MICROALBUMIN CREAT RATIO URINE    CANCELED: Lipid Profile    CANCELED: HEMOGLOBIN A1C      6. Dyslipidemia  APOLIPOPROTEIN B    Lipoprotein (a)    CANCELED: Lipid Profile      7. Coronary artery calcification seen on CT scan  CRP HIGH SENSITIVE (CARDIAC)         Established CVD:     1) ASCENDING AORTIC DILATION - stable, asymptomatic  - presumed sporadic development from cystic medial degeneration, HTN, hx of tobacco; likely some degree of atherosclerosis is contributory  - no known hereditary thoracic aortic disease (HTAD)  or non-syndromic (nsHTAD) in pmhx or fhx  - as reviewed with pt, surg repair indicated if 5.5+cm or rapid expansion rate due to increased risk for rupture/dissections  - rapid expansion for sporadic or nsHTAD TAAs (>0.5+ cm/yr or >0.3 cm/yr in 2 consecutive years)  11/2024 LDCT = 4.0cm - INITIAL sizing - considered \"dilation\" only per [place] Ao calculator  - Ao calculator expected mean is 3.5cm   Plan:     Lifestyle mgmt:  - continue healthy lifestyle choices as noted below and avoid all tobacco products   - activity restrictions to reduce risk of aneurysmal expansion include avoidance of the following:  long duration high intensity cardio, extreme endurance activities, high-intensity strength training >20-30lbs, and avoid straining or holding breath when lifting    Med mgmt:   - focus BB and ARB as 1st line HTN agents  - treatment of dyslipidemia, dysglycemia   - avoid all prescription or illicit stimulants     Screening/surveillance:  - recommend 1st degree relatives get screened with echo for TAA (per 2022 ACC guideline)  - consider genetic aortopathy evaluation if 1+ first degree relatives with dilation/aneurysm  - recommended for lifelong surveillance  - initial CTA complete Ao  in 6 months (5/2025) from prior  -  check echo to r/o expansion and " "bicuspid Ao valve   - will base future surveillance on results, likely q2yr echo surveillance, and consider CTA q3-5yr or if indications of rapid expansion noted    2) Non-aneurysmal aortic atherosclerosis (AS) per CT -  no symptoms or prior known associated clinical manifestations  or indications of complex features  - general indicator of systemic AS with higher potential AS in other vascular beds, possible indicator of higher overall ASCVD risk   Plan:  - no further imaging surveillance, continue med mgmt      3) R iliac art ectasia - no prior claudication or leg pains, asymptomatic   2017 duplex = 1.46cm   Plan:  - update CTA complete Ao for accurate updated sizing   - consider surveillance based upon findings  - continue med mgmt, monitor for new LE symptoms     LIPID MANAGEMENT  Qualifies for Statin Therapy Based on 2018 ACC/AHA Guidelines: yes, Diabetes  10-yr ASCVD risk score: The 10-year ASCVD risk score (Fernando LIMA, et al., 2019) is: 30.3%, >20% \"high risk\"   Major ASCVD events: None    High-risk conditions: N/A  Risk-enhancers: Metabolic syndrome   Currently on Statin: Yes  Tx goals: LDL-C <70   At goal?  no  Plan:   - continue pravastatin 40mg daily,  has partial intolerance due to atorva failure  - will likely add zetia and/or nexletol based upon future labs   - update labs 6mo    BLOOD PRESSURE MANAGEMENT  BP Goal ACC/AHA (2017) goal <130/80  Home BP at goal:  yes  Office BP at goal:  yes  24h ABPM:  not ordered to date   RDN candidate? NO  Contributing factors: n/a    BP meds recommended for reduction of expansion rate of TAA:   - Beta-blocker (anti-impulse therapy to decrease pulsatile wall stress)   - ARB (inhibits TGF-beta activity in aortic wall,, most widely studied is losartan)   Plan:   - start/continue home BP monitoring, reviewed correct technique, provide BP log and instructions  Medications:  - stop lisino/hctz, start losartan/hctz 50mg/12.5mg qAM    GLYCEMIC MANAGEMENT Diabetic, T2 with " HTN, vasc dz   Goal A1c < 7.0  Lab Results   Component Value Date    HBA1C 6.6 (A) 10/23/2024      Lab Results   Component Value Date/Time    MALBCRT see below 10/15/2024 06:32 AM    MICROALBUR <1.2 10/15/2024 06:32 AM     Plan:  - continue current medication plan per PCP  - recommmend for routine care with PCP (or endocrine) to include regular A1c monitoring, annual albumin/creatinine ratio (ACR), annual diabetic retinopathy screening, foot exams, annual flu vaccine, and updates to pneumonia vaccines as appropriate      ANTITHROMBOTIC THERAPY   - not currently indicated     LIFESTYLE INTERVENTIONS  TOBACCO: Stages of Change: Maintenance (sustained change >6mo)    reports that he quit smoking about 37 years ago. His smoking use included cigarettes. He started smoking about 57 years ago. He has a 60 pack-year smoking history. He has never used smokeless tobacco.   - continued complete avoidance of all tobacco products   NUTRITION: Mediterranean, reduce Na to 1500mg/day, and Diabetic diet - reduce CHO and kcal    ETOH: limit to 2 or less standard drinks/day   WT MGMT: maintain healthy weight     PHYSICAL ACTIVITY: as noted above   As per 2022 ACC/AHA guideline for aortic disease regarding exercise:   - In patients with aortic disease, limited data are available to guide recommendations regarding the forms of exercise that are safe and promote cardiovascular health versus those that pose an acute or  long-term risk of aortic growth or rupture. But evidence exists regarding the physiologic benefits of exercise and the hemodynamic consequences of various form of exercise and exertion in case series and relevant animal models.   - consensus among numerous expert committees that it is wise to avoid intense isometric exertion or exercises that require the Valsalva maneuver, given that heavy lifting with Valsalva can produce acute increases in SBP to >300 mm Hg.   - consensus that light weightlifting and low-intensity  aerobic exercise are safe and likely improve both physical and mental health.   - No uniform consensus exists about the safety of intermediate-level static and aerobic exercise.   - Recommendations for exercise intensity are best individualized informed by multiple factors that include underlying aortic pathology, aortic diameter and ASI, aortic growth rate, age, family history, and any other high-risk features (eg, uncontrolled hypertension).  - Ongoing investigation is needed to better define the levels of resistance activities that would be considered low-risk for adverse aortic events, favoring greater exercise restrictions among patients at higher risk of dissection     OTHER:  none     STUDIES:  5/2025 - echo, CTA complete Ao - ordered, RN to track   LABS: as noted above  Follow up in: RTC in 6 months     Augusto Grande M.D.   [facility]  [facility]  [phone]    Cc:

## 2024-11-26 ENCOUNTER — OFFICE VISIT (OUTPATIENT)
Dept: DERMATOLOGY | Facility: IMAGING CENTER | Age: 74
End: 2024-11-26
Payer: MEDICARE

## 2024-11-26 DIAGNOSIS — L82.1 SK (SEBORRHEIC KERATOSIS): ICD-10-CM

## 2024-11-26 DIAGNOSIS — D22.9 MULTIPLE NEVI: ICD-10-CM

## 2024-11-26 DIAGNOSIS — D18.01 CHERRY ANGIOMA: ICD-10-CM

## 2024-11-26 DIAGNOSIS — Z12.83 SKIN CANCER SCREENING: ICD-10-CM

## 2024-11-26 DIAGNOSIS — L81.4 LENTIGINES: ICD-10-CM

## 2024-11-26 PROCEDURE — 99212 OFFICE O/P EST SF 10 MIN: CPT | Performed by: NURSE PRACTITIONER

## 2024-11-26 NOTE — PROGRESS NOTES
DERMATOLOGY NOTE  NEW VISIT       Chief complaint: Establish Care     HPI/location: scalp lesion that's scabs   Time present: +1 mth   Painful lesion: No  Itching lesion: No  Enlarging lesion: No  Anything make it better or worse? Cutting hair         History of skin cancer: No  History of precancers/actinic keratoses: No  History of biopsies:No  History of blistering/severe sunburns:Yes, Details: as an adult   Family history of skin cancer:Yes, Details: mother poss melanoma   Family history of atypical moles:No      Allergies   Allergen Reactions    Atorvastatin      Left sided weakness    Bactine     Keflex     Simvastatin      Left sided weakness        MEDICATIONS:  Medications relevant to specialty reviewed.     REVIEW OF SYSTEMS:   Positive for skin (see HPI)  Negative for fevers and chills       EXAM:  There were no vitals taken for this visit.  Constitutional: Well-developed, well-nourished, and in no distress.     A total body skin exam was performed including the genitals per patient preference and including the following areas: head (including face), neck, chest, abdomen, groin/buttocks, back, bilateral upper extremities, and bilateral lower extremities with the following pertinent findings listed below and/or in assessment/plan.     -sun exposed skin of trunk and b/l upper, lower extremities and face with scattered clinically benign light brown reticulated macules all of which were morphologically similar and none of which were suspicious for skin cancer today on exam  Several scattered 1-3mm bright red macules and thin papules on the trunk and extremities  Several light brown medium brown stuck-on waxy papules scattered on the face, trunk, and extremities as well as scalp  Multiple light brown medium and dark brown macules papules scattered over the trunk, face and extremities--All with benign-appearing pigment network patterns on dermoscopy      IMPRESSION / PLAN:    1. Lentigines  - Benign-appearing  nature of lesions discussed during exam.   - Advised to continue to monitor for any return to clinic for new or concerning changes.      2. Cherry angioma  - Benign-appearing nature of lesions discussed during exam.   - Advised to continue to monitor for any return to clinic for new or concerning changes.      3. SK (seborrheic keratosis)  - Benign-appearing nature of lesions discussed during exam.   - Advised to continue to monitor for any return to clinic for new or concerning changes.      4. Multiple nevi  - Benign-appearing nature of lesions discussed during exam.   - Advised to continue to monitor for any return to clinic for new or concerning changes.  - ABCDE's of melanoma discussed/handout given      5. Skin cancer screening  Skin cancer education  discussed importance of sun protective clothing, eyewear in addition to the use of broad spectrum sunscreen with SPF 30 or greater, as well as need for reapplication ~every 2 hours when exposed to UVR/handout given  discussed importance following up for any new or changing lesions as noted in handout given, but every 12 months exams in clinic in the setting of dermatologic history  ABCDE's of melanoma discussed/handout given        Please note that this dictation was created using voice recognition software. I have made every reasonable attempt to correct obvious errors, but I expect that there are errors of grammar and possibly content that I did not discover before finalizing the note.      Return to clinic in: Return in about 1 year (around 11/26/2025) for AUREA. and as needed for any new or changing skin lesions.

## 2025-01-18 ENCOUNTER — OFFICE VISIT (OUTPATIENT)
Dept: URGENT CARE | Facility: PHYSICIAN GROUP | Age: 75
End: 2025-01-18
Payer: MEDICARE

## 2025-01-18 VITALS
HEIGHT: 63 IN | BODY MASS INDEX: 26.38 KG/M2 | HEART RATE: 78 BPM | SYSTOLIC BLOOD PRESSURE: 120 MMHG | WEIGHT: 148.9 LBS | DIASTOLIC BLOOD PRESSURE: 66 MMHG | RESPIRATION RATE: 14 BRPM | OXYGEN SATURATION: 97 % | TEMPERATURE: 97.8 F

## 2025-01-18 DIAGNOSIS — S01.552A OPEN WOUND OF TONGUE DUE TO BITE: Primary | ICD-10-CM

## 2025-01-18 DIAGNOSIS — K14.0 TONGUE INFECTION: ICD-10-CM

## 2025-01-18 PROCEDURE — 99213 OFFICE O/P EST LOW 20 MIN: CPT | Performed by: PHYSICIAN ASSISTANT

## 2025-01-18 PROCEDURE — 3074F SYST BP LT 130 MM HG: CPT | Performed by: PHYSICIAN ASSISTANT

## 2025-01-18 PROCEDURE — 3078F DIAST BP <80 MM HG: CPT | Performed by: PHYSICIAN ASSISTANT

## 2025-01-18 PROCEDURE — 1125F AMNT PAIN NOTED PAIN PRSNT: CPT | Performed by: PHYSICIAN ASSISTANT

## 2025-01-18 RX ORDER — AMOXICILLIN 875 MG/1
875 TABLET, COATED ORAL 2 TIMES DAILY
Qty: 10 TABLET | Refills: 0 | Status: SHIPPED | OUTPATIENT
Start: 2025-01-18 | End: 2025-01-23

## 2025-01-18 RX ORDER — MELOXICAM 15 MG/1
TABLET ORAL
COMMUNITY

## 2025-01-18 ASSESSMENT — FIBROSIS 4 INDEX: FIB4 SCORE: 1.32

## 2025-01-18 ASSESSMENT — PAIN SCALES - GENERAL: PAINLEVEL_OUTOF10: 3=SLIGHT PAIN

## 2025-01-18 NOTE — PROGRESS NOTES
Subjective     Saul Knox is a 74 y.o. male who presents with Abscess (On tongue x 3 days )    PMH:  has a past medical history of Hypercalcemia (01/26/2020), Hypercalcemia (01/26/2020), and Hyperlipidemia (07/23/2018).  MEDS:   Current Outpatient Medications:     meloxicam (MOBIC) 15 MG tablet, , Disp: , Rfl:     losartan-hydrochlorothiazide (HYZAAR) 50-12.5 MG per tablet, Take 1 Tablet by mouth every day. To lower blood pressure, Disp: 100 Tablet, Rfl: 3    pravastatin (PRAVACHOL) 40 MG tablet, Take 1 Tablet by mouth every day., Disp: 100 Tablet, Rfl: 3    metFORMIN (GLUCOPHAGE) 500 MG Tab, Take 1 Tablet by mouth 4 times a day., Disp: 400 Tablet, Rfl: 3  ALLERGIES:   Allergies   Allergen Reactions    Atorvastatin      Left sided weakness    Bactine     Keflex     Simvastatin      Left sided weakness     SURGHX: History reviewed. No pertinent surgical history.  SOCHX:  reports that he quit smoking about 38 years ago. His smoking use included cigarettes. He started smoking about 58 years ago. He has a 60 pack-year smoking history. He has never used smokeless tobacco. He reports that he does not currently use alcohol. He reports that he does not use drugs.  FH: Reviewed with patient, not pertinent to this visit.           Patient presents with possible infected bite of his tongue. PT states he bites his tongue frequently when chewing, but also sometimes in his sleep.  Patient has been using salt water swish and spit with no real improvement.  Patient states this is particularly painful so he is concerned it is infected since he tasted a foul taste today.  Patient has had dental abscesses in the past with similar taste in his mouth, he currently has no tooth pain so he believes this is coming from his tongue.  No other complaints.           Review of Systems   All other systems reviewed and are negative.             Objective     /66 (BP Location: Right arm, Patient Position: Sitting, BP Cuff Size:  "Adult)   Pulse 78   Temp 36.6 °C (97.8 °F) (Temporal)   Resp 14   Ht 1.6 m (5' 3\")   Wt 67.5 kg (148 lb 14.4 oz)   SpO2 97%   BMI 26.38 kg/m²      Physical Exam  Vitals and nursing note reviewed.   Constitutional:       General: He is not in acute distress.     Appearance: Normal appearance. He is well-developed. He is not ill-appearing or toxic-appearing.   HENT:      Head: Normocephalic and atraumatic.      Right Ear: Tympanic membrane normal.      Left Ear: Tympanic membrane normal.      Nose: Nose normal.      Mouth/Throat:      Lips: Pink.      Mouth: Mucous membranes are moist.      Pharynx: Oropharynx is clear. Uvula midline.     Eyes:      Extraocular Movements: Extraocular movements intact.      Conjunctiva/sclera: Conjunctivae normal.      Pupils: Pupils are equal, round, and reactive to light.   Cardiovascular:      Rate and Rhythm: Normal rate and regular rhythm.      Pulses: Normal pulses.      Heart sounds: Normal heart sounds.   Pulmonary:      Effort: Pulmonary effort is normal.      Breath sounds: Normal breath sounds.   Abdominal:      General: Bowel sounds are normal.      Palpations: Abdomen is soft.   Musculoskeletal:         General: Normal range of motion.      Cervical back: Normal range of motion and neck supple.   Skin:     General: Skin is warm and dry.      Capillary Refill: Capillary refill takes less than 2 seconds.   Neurological:      General: No focal deficit present.      Mental Status: He is alert and oriented to person, place, and time.      Cranial Nerves: No cranial nerve deficit.      Motor: Motor function is intact.      Coordination: Coordination is intact.      Gait: Gait normal.   Psychiatric:         Mood and Affect: Mood normal.                             Assessment & Plan        Assessment & Plan  Open wound of tongue due to bite    Orders:    amoxicillin (AMOXIL) 875 MG tablet; Take 1 Tablet by mouth 2 times a day for 5 days.    Tongue infection    Orders:    " amoxicillin (AMOXIL) 875 MG tablet; Take 1 Tablet by mouth 2 times a day for 5 days.              PT HPI and PE are consistent with infected bite wound of left side of tongue.  I will treat with amoxicillin BID x 5 days.     PT advised saltwater gargles/swishes  3-4 times daily until symptoms improve.     PT can begin or continue OTC medications, increase fluids and rest until symptoms improve.     Differential diagnosis, supportive care, and indications for immediate follow-up discussed with patient.  Instructed to return to clinic or nearest emergency department for any change in condition, further concerns, or worsening of symptoms.    I personally reviewed prior external notes and test results pertinent to today's visit.  I have independently reviewed and interpreted all diagnostics ordered during this urgent care visit.    PT should follow up with PCP in 1-2 days for re-evaluation if symptoms have not improved.      Discussed red flags and reasons to return to UC or ED.      Pt and/or family verbalized understanding of diagnosis and follow up instructions and was offered informational handout on diagnosis.  PT discharged.     Please note that this dictation was created using voice recognition software. I have made every reasonable attempt to correct obvious errors, but I expect that there may be errors of grammar and possibly content that I did not discover before finalizing the note.

## 2025-02-10 ENCOUNTER — TELEPHONE (OUTPATIENT)
Dept: HEALTH INFORMATION MANAGEMENT | Facility: OTHER | Age: 75
End: 2025-02-10
Payer: MEDICARE

## 2025-02-10 ASSESSMENT — ACTIVITIES OF DAILY LIVING (ADL): BATHING_REQUIRES_ASSISTANCE: 0

## 2025-02-10 ASSESSMENT — PATIENT HEALTH QUESTIONNAIRE - PHQ9
1. LITTLE INTEREST OR PLEASURE IN DOING THINGS: NOT AT ALL
2. FEELING DOWN, DEPRESSED, IRRITABLE, OR HOPELESS: NOT AT ALL

## 2025-02-10 ASSESSMENT — ENCOUNTER SYMPTOMS: GENERAL WELL-BEING: GOOD

## 2025-02-11 ENCOUNTER — OFFICE VISIT (OUTPATIENT)
Dept: FAMILY PLANNING/WOMEN'S HEALTH CLINIC | Facility: PHYSICIAN GROUP | Age: 75
End: 2025-02-11
Payer: MEDICARE

## 2025-02-11 VITALS
DIASTOLIC BLOOD PRESSURE: 62 MMHG | HEART RATE: 63 BPM | SYSTOLIC BLOOD PRESSURE: 120 MMHG | WEIGHT: 140 LBS | TEMPERATURE: 97.2 F | OXYGEN SATURATION: 93 % | HEIGHT: 63 IN | BODY MASS INDEX: 24.8 KG/M2

## 2025-02-11 DIAGNOSIS — E78.5 TYPE 2 DIABETES MELLITUS WITH HYPERLIPIDEMIA (HCC): ICD-10-CM

## 2025-02-11 DIAGNOSIS — E11.59 HYPERTENSION ASSOCIATED WITH TYPE 2 DIABETES MELLITUS (HCC): ICD-10-CM

## 2025-02-11 DIAGNOSIS — I15.2 HYPERTENSION ASSOCIATED WITH TYPE 2 DIABETES MELLITUS (HCC): ICD-10-CM

## 2025-02-11 DIAGNOSIS — I25.10 CORONARY ARTERY CALCIFICATION SEEN ON CT SCAN: ICD-10-CM

## 2025-02-11 DIAGNOSIS — E11.69 TYPE 2 DIABETES MELLITUS WITH HYPERLIPIDEMIA (HCC): ICD-10-CM

## 2025-02-11 DIAGNOSIS — E78.5 DYSLIPIDEMIA: ICD-10-CM

## 2025-02-11 DIAGNOSIS — E11.42 TYPE 2 DIABETES MELLITUS WITH DIABETIC POLYNEUROPATHY, WITHOUT LONG-TERM CURRENT USE OF INSULIN (HCC): ICD-10-CM

## 2025-02-11 PROCEDURE — 3078F DIAST BP <80 MM HG: CPT

## 2025-02-11 PROCEDURE — G0439 PPPS, SUBSEQ VISIT: HCPCS

## 2025-02-11 PROCEDURE — 3074F SYST BP LT 130 MM HG: CPT

## 2025-02-11 PROCEDURE — 1126F AMNT PAIN NOTED NONE PRSNT: CPT

## 2025-02-11 SDOH — ECONOMIC STABILITY: FOOD INSECURITY: WITHIN THE PAST 12 MONTHS, YOU WORRIED THAT YOUR FOOD WOULD RUN OUT BEFORE YOU GOT THE MONEY TO BUY MORE.: NEVER TRUE

## 2025-02-11 SDOH — ECONOMIC STABILITY: HOUSING INSECURITY: AT ANY TIME IN THE PAST 12 MONTHS, WERE YOU HOMELESS OR LIVING IN A SHELTER (INCLUDING NOW)?: NO

## 2025-02-11 SDOH — ECONOMIC STABILITY: HOUSING INSECURITY: IN THE LAST 12 MONTHS, WAS THERE A TIME WHEN YOU WERE NOT ABLE TO PAY THE MORTGAGE OR RENT ON TIME?: NO

## 2025-02-11 SDOH — ECONOMIC STABILITY: FOOD INSECURITY: WITHIN THE PAST 12 MONTHS, THE FOOD YOU BOUGHT JUST DIDN'T LAST AND YOU DIDN'T HAVE MONEY TO GET MORE.: NEVER TRUE

## 2025-02-11 SDOH — ECONOMIC STABILITY: FOOD INSECURITY: HOW HARD IS IT FOR YOU TO PAY FOR THE VERY BASICS LIKE FOOD, HOUSING, MEDICAL CARE, AND HEATING?: NOT HARD AT ALL

## 2025-02-11 SDOH — ECONOMIC STABILITY: HOUSING INSECURITY: IN THE PAST 12 MONTHS, HOW MANY TIMES HAVE YOU MOVED WHERE YOU WERE LIVING?: 0

## 2025-02-11 SDOH — ECONOMIC STABILITY: TRANSPORTATION INSECURITY: IN THE PAST 12 MONTHS, HAS LACK OF TRANSPORTATION KEPT YOU FROM MEDICAL APPOINTMENTS OR FROM GETTING MEDICATIONS?: NO

## 2025-02-11 ASSESSMENT — PAIN SCALES - GENERAL: PAINLEVEL_OUTOF10: NO PAIN

## 2025-02-11 ASSESSMENT — FIBROSIS 4 INDEX: FIB4 SCORE: 1.32

## 2025-02-11 ASSESSMENT — ACTIVITIES OF DAILY LIVING (ADL): LACK_OF_TRANSPORTATION: NO

## 2025-02-11 ASSESSMENT — PATIENT HEALTH QUESTIONNAIRE - PHQ9: CLINICAL INTERPRETATION OF PHQ2 SCORE: 0

## 2025-02-11 NOTE — LETTER
[email or web address] [facility]  JOSE LUIS PappasP.RSaraN.  [address] [facility] [address]  [place] [address]  Fax: [phone]   Authorization for Release/Disclosure of   Protected Health Information   Name: SVETA KNOX : 1950 SSN: [id number]   Address: [facility] [address]  [place] [address] Phone:    There are no phone numbers on file.   I authorize the entity listed below to release/disclose the PHI below to:   [facility]/ANTONIETTA Pappas.P.R.RUDI and ANABEL Wyatt   Provider or Entity Name:     Address   City, State, Zip   Phone:      Fax:     Reason for request: continuity of care   Information to be released:    [  ] LAST COLONOSCOPY,  including any PATH REPORT and follow-up  [  ] LAST FIT/COLOGUARD RESULT [  ] LAST DEXA  [  ] LAST MAMMOGRAM  [  ] LAST PAP  [  ] LAST LABS [  ] RETINA EXAM REPORT  [  ] IMMUNIZATION RECORDS  [  ] Release all info      [  ] Check here and initial the line next to each item to release ALL health information INCLUDING  _____ Care and treatment for drug and / or alcohol abuse  _____ HIV testing, infection status, or AIDS  _____ Genetic Testing    DATES OF SERVICE OR TIME PERIOD TO BE DISCLOSED: _____________  I understand and acknowledge that:  * This Authorization may be revoked at any time by you in writing, except if your health information has already been used or disclosed.  * Your health information that will be used or disclosed as a result of you signing this authorization could be re-disclosed by the recipient. If this occurs, your re-disclosed health information may no longer be protected by State or Federal laws.  * You may refuse to sign this Authorization. Your refusal will not affect your ability to obtain treatment.  * This Authorization becomes effective upon signing and will  on (date) __________.      If no date is indicated, this Authorization will  one (1) year from the signature date.    Name: Sveta Knox  Signature: Date:    2/11/2025     PLEASE FAX REQUESTED RECORDS BACK TO: [phone]   <-- Click to add NO pertinent Past Medical History

## 2025-02-11 NOTE — ASSESSMENT & PLAN NOTE
Latest Reference Range & Units 10/15/24 06:33   Cholesterol,Tot 100 - 199 mg/dL 204 (H)   Triglycerides 0 - 149 mg/dL 65   HDL >=40 mg/dL 67   LDL <100 mg/dL 124 (H)   (H): Data is abnormally high     Chronic, stable. The patient denies any side effects from the current medication. Continue with current defined treatment plan: pravastatin (PRAVACHOL) 40 MG tablet. Follow-up at least annually.

## 2025-02-11 NOTE — ASSESSMENT & PLAN NOTE
Chronic, stable. Last A1c was 6.6 on 10/23/24. The patient reports neuropathy in his bilateral feet and hands.  The patient denies balance or gait problems.  No current treatment.  He takes Metformin for diabetes.   Follow-up at least annually.

## 2025-02-11 NOTE — ASSESSMENT & PLAN NOTE
Chronic, stable. Noted on CT Chest (Thorax) on 11/4/24. Continue with current defined treatment plan: pravastatin (PRAVACHOL) 40 MG tablet.  Continue with blood pressure control.  Follow-up at least annually.

## 2025-02-11 NOTE — ASSESSMENT & PLAN NOTE
Chronic, stable. Blood pressure today 120/62. The patient's blood pressure is well controlled with current medication. Continue with current defined treatment plan: losartan-hydrochlorothiazide (HYZAAR) 50-12.5 MG per tablet. Follow-up at least annually.

## 2025-04-21 ENCOUNTER — HOSPITAL ENCOUNTER (OUTPATIENT)
Dept: LAB | Facility: MEDICAL CENTER | Age: 75
End: 2025-04-21
Attending: NURSE PRACTITIONER
Payer: MEDICARE

## 2025-04-21 DIAGNOSIS — E78.5 HYPERLIPIDEMIA, UNSPECIFIED HYPERLIPIDEMIA TYPE: ICD-10-CM

## 2025-04-21 DIAGNOSIS — E11.42 TYPE 2 DIABETES MELLITUS WITH DIABETIC POLYNEUROPATHY, WITHOUT LONG-TERM CURRENT USE OF INSULIN (HCC): ICD-10-CM

## 2025-04-21 LAB
CHOLEST SERPL-MCNC: 174 MG/DL (ref 100–199)
EST. AVERAGE GLUCOSE BLD GHB EST-MCNC: 200 MG/DL
HBA1C MFR BLD: 8.6 % (ref 4–5.6)
HDLC SERPL-MCNC: 61 MG/DL
LDLC SERPL CALC-MCNC: 100 MG/DL
TRIGL SERPL-MCNC: 67 MG/DL (ref 0–149)

## 2025-04-21 PROCEDURE — 80061 LIPID PANEL: CPT

## 2025-04-21 PROCEDURE — 83036 HEMOGLOBIN GLYCOSYLATED A1C: CPT

## 2025-04-21 PROCEDURE — 36415 COLL VENOUS BLD VENIPUNCTURE: CPT

## 2025-04-22 ENCOUNTER — RESULTS FOLLOW-UP (OUTPATIENT)
Dept: MEDICAL GROUP | Facility: MEDICAL CENTER | Age: 75
End: 2025-04-22

## 2025-04-24 NOTE — Clinical Note
[facility]  [address]  [place] [address]    [id number]    Saul Knox  [address] [address]  [place] [address]    April 24, 2025    Member Name: Sveta Knox   Member Number: [id number]   Reference Number: [id number]   Approved Services: MRI/CAT Scan   Approved Service Dates: 04/24/2025 - 08/22/2025   Requesting Provider: Augusto Grande   Requested Provider: [facility]     Dear Sveta Velazquez Abilio:    The following medical service(s) requested by Augusto Grande have been approved:    Procedure Code Procedure Code Name Requested Quantity Approved Quantity Status   40555 (CPT®) NE CT ANGIO, CHEST (NON-CORON), COMBO, INCL * 1 1 Authorized   71472 (CPT®) NE CT ANGIO, ABD, COMBO,INCL IMAGE PROC 1 1 Authorized       Approved Quantity means the number of visits approved for medication treatments and/or medical services.    The services should be provided by [facility] no later than 08/22/2025. Please contact the provider listed below with any questions.     Provider Information:  [facility]  [phone]    Your plan benefit may require a deductible, co-payment or coinsurance for these services. This authorization does not guarantee [facility] will pay the claim for services that you receive. Payment by [facility] for these services is subject to the terms of your Evidence of Coverage, your eligibility at the time of service, and confirmation of benefit coverage.    For any questions or additional information, please contact Customer Service:    [facility] Toll Free: [phone]  [employer] users dial: [phone]   Call Center Hours:  Oct 1 - Mar 31, Mon - Fri 7 AM to 8 PM PST  Oct 1 - Mar 31, Sat - Sun 8 AM to 8 PM PST  Apr 1 - Sep 30, Mon - Fri 7 AM to 8 PM PST   Office Hours: Mon - Fri 8 AM to 5 PM PST   E-mail: [email or web address]   Website:  [email or web address]      This information is  available for free in other languages. Please contact Customer Service at the phone number above for more information. [facility] complies with applicable Federal civil rights laws and does not discriminate on the basis of race, color, national origin, age, disability or sex.    Sincerely,     Healthcare Utilization Management Department     Cc: [facility]   Augusto Grande    Multi-Language Insert  Multi- Services  English: We have free  services to answer any questions you may have about our health or drug plan.  To get an , just call us at [phone].  Someone who speaks English/Language can help you.  This is a free service.  Nepali: Tenemos servicios de intérprete sin costo alguno  para responder cualquier pregunta que pueda tener sobre nuestro plan de noe o medicamentos. Para hablar con un intérprete, por favor llame al [phone]. Alguien que hable español le podrá ayudar. Taylor es un servicio gratuito.  Chinese Mandarin: ?????????????????????????????? ???????????????? [phone]????????????????? ?????????  Chinese Cantonese: ?????????????????????????????? ????????????? [phone]???????????????????? ????????  Tagalog:  Mayroon kaming libreng serbisyyasmeen gainessasalsumeet jamesnggiino shresthagrehan o panggamot.  jf Sellers  [phone]. Jo Haywood.  Valeriy anne.  American:  Nous proposons jayro services gratuits d'interprétation pour répondre à toutes sacha questions relatives à notre régime de santé ou d'assurance-médicaments. Pour accéder au service d'interprétation, il vous suffit de nous appeler au [phone]. Un interlocuteur parlant Français pourra vous aider. Ce service est gratuit.  Maltese:  Ghulam herrera có d?ch v? thông d?ch mi?n phí ð? tr? l?i các câu  h?i v? chýõng s?c kh?e và chýõng trình thu?c men. N?u quí v? c?n thông d?ch viên mickey g?i [phone] s? có nhân viên nói ti?ng Vi?t giúp ð? quí v?. Ðây là d?ch v? mi?n phí .  Burkinan:  Unser kostenloser Dolmetscherservice beantwortet Ihren Fragen zu unserem Gesundheits- und Arzneimittelplan. Unsere Dolmetscher erreichen Sie [phone]. Man wird Ihnen kamala auNi[facility]. Dieser Service ist bienvenidoVA Hospital.  Kiswahili:  ??? ?? ?? ?? ?? ??? ?? ??? ?? ???? ?? ?? ???? ???? ????. ?? ???? ????? ?? [phone] ??? ??? ????.  ???? ?? ???? ?? ?? ????. ? ???? ??? ?????.   Mosotho: Isabelè ó âàñ âîçíèêíóò âîïðîñû îòíîñèòåëüíî ñòðàõîâîãî èëè ìåäèêàìåíòíîãî ïëàíà, âû ìîæåòå âîñïîëüçîâàòüñÿ íàøèìè áåñïëàòíûìè óñëóãàìè ïåðåâîä÷èêîâ. ×òîáû âîñïîëüçîâàòüñÿ óñëóãàìè ïåðåâîä÷èêà, ïîçâîíèòå íàì ïî òåëåôîíó [phone]. Âàì îêàæåò ïîìîùü ñîòðóäíèê, êîòîðûé ãîâîðèò ïî-póññêè. Äàííàÿ óñëóãà áåñïëàòíàÿ.  German: ÅääÇ äÞÏã ÎÏãÇÊ ÇáãÊÑÌã ÇáÝæÑí ÇáãÌÇäíÉ ááÅÌÇÈÉ Úä Ãí ÃÓÆáÉ ÊÊÚáÞ ÈÇáÕÍÉ Ãæ ÌÏæá ÇáÃÏæíÉ áÏíäÇ. ááÍÕæá Úáì ãÊÑÌã ÝæÑí¡ áíÓ Úáíß Óæì ÇáÇÊÕÇá ÈäÇ Úáì [phone] . ÓíÞæã ÔÎÕ ãÇ íÊÍÏË ÇáÚÑÈíÉ ÈãÓÇÚÏÊß. åÐå ÎÏãÉ ãÌÇäíÉ.  Rosa M: ????? ????????? ?? ??? ?? ????? ?? ???? ??? ???? ???? ?? ?????? ?? ???? ???? ?? ??? ????? ??? ????? ???????? ?????? ?????? ???. ?? ???????? ??????? ???? ?? ???, ?? ???? [phone] ?? ??? ????. ??? ??????? ?? ?????? ????? ?? ???? ??? ?? ???? ??. ?? ?? ????? ???? ??.   Belarusian:  È disponibile un servizio di interpretariato gratuito per rispondere a eventuali domande sul nostro piano sanitario e farmaceutico. Per un interprete, contattare il sonia [phone]. Un nostro incaricato yovani parla Italianovi fornirà l'assistenza necessaria. È un servizio gratuito.  Portugués:  Dispomos de serviços de interpretação gratuitos para responder a qualquer questão que tenha acerca do nosso plano de saúde ou de medicação. Para obter um intérprete, contacte-nos através do número [phone]. Irá encontrar  alguém que fale o idioma  Português para o ajudar. Taylor serviço é gratuito.  Turkmen Creole:  Nou genyen sèvis entèprèt gratis mendez reponn tout kesyon ou ta genyen konsènan plan medikal oswa dwòg nou an.  Mendez jwenn yon entèprèt, jis rele nou nan [phone]. Yon moun ki pale Kreyòl kapab abram w.  Sa a se yon sèvis ki gratis.  Polish:  Umo¿liwiamy bezp³atne skorzystanie z us³ug t³umacza ustnego, który pomo¿e w uzyskaniu odpowiedzi na temat planu zdrowotnego lub dawkowania leków. Candy skorzystaæ z pomocy t³umacza znaj¹cego mani andrade¿y zadzwoniæ pod numer [phone]. Ta us³uga jest bezp³atna.  Rwandan: ????? ??????? ????????????????????? ??????????????????????????????????[phone] ???????????????? ? ????????????????? ?????

## 2025-04-24 NOTE — Clinical Note
[facility]  [address]  [place] [address]    [id number]    Saul Knox  [address] [address]  [place] [address]    April 24, 2025    Member Name: Sveta Knox   Member Number: [id number]   Reference Number: [id number]   Approved Services: Echos and EKG   Approved Service Dates: 04/24/2025 - 08/22/2025   Requesting Provider: Augusto Grande   Requested Provider: [facility]     Dear Sveta Velazquez Abilio:    The following medical service(s) requested by Augusto Grande have been approved:    Procedure Code Procedure Code Name Requested Quantity Approved Quantity Status   34552 (CPT®) TX ECHO HEART XTHORACIC,COMPLETE W DOPPLER 1 1 Authorized       Approved Quantity means the number of visits approved for medication treatments and/or medical services.    The services should be provided by [facility] no later than 08/22/2025. Please contact the provider listed below with any questions.     Provider Information:  [facility]  [phone]    Your plan benefit may require a deductible, co-payment or coinsurance for these services. This authorization does not guarantee [facility] will pay the claim for services that you receive. Payment by [facility] for these services is subject to the terms of your Evidence of Coverage, your eligibility at the time of service, and confirmation of benefit coverage.    For any questions or additional information, please contact Customer Service:    [facility] Toll Free: [phone]  [employer] users dial: [phone]   Call Center Hours:  Oct 1 - Mar 31, Mon - Fri 7 AM to 8 PM PST  Oct 1 - Mar 31, Sat - Sun 8 AM to 8 PM PST  Apr 1 - Sep 30, Mon - Fri 7 AM to 8 PM PST   Office Hours: Mon - Fri 8 AM to 5 PM PST   E-mail: [email or web address]   Website:  [email or web address]      This information is available for free in other languages. Please contact Customer Service at  the phone number above for more information. [facility] complies with applicable Federal civil rights laws and does not discriminate on the basis of race, color, national origin, age, disability or sex.    Sincerely,     Healthcare Utilization Management Department     Cc: [facility]   Augusto Grande    Multi-Language Insert  Multi- Services  English: We have free  services to answer any questions you may have about our health or drug plan.  To get an , just call us at [phone].  Someone who speaks English/Language can help you.  This is a free service.  Welsh: Tenemos servicios de intérprete sin costo alguno  para responder cualquier pregunta que pueda tener sobre nuestro plan de noe o medicamentos. Para hablar con un intérprete, por favor llame al [phone]. Alguien que hable español le podrá ayudar. [facility] es un servicio gratuito.  Chinese Mandarin: ?????????????????????????????? ???????????????? [phone]????????????????? ?????????  Chinese Cantonese: ?????????????????????????????? ????????????? [phone]???????????????????? ????????  Tagalog:  Meg yusuf serbisyo sa gainessakimberli lopeza sven beaver hinggil sa pam alcala o panggamot.  fj Sellers  [phone]. Jo lord Tagalog.  Valeriy anne.  Thai:  Nous proposons jayro services gratuits d'interprétation pour répondre à toutes sacha questions relatives à notre régime de santé ou d'assurance-médicaments. Pour accéder au service d'interprétation, il vous suffit de nous appeler au [phone]. Un interlocuteur parlant Français pourra vous aider. Ce service est gratuit.  Wolof:  Ghulam herrera có d?ch v? thông d?ch mi?n phí ð? tr? l?i các câu h?i v? chýõng s?c kh?e và chýõng trình thu?c men. N?u quí v? c?n galileo  d?ch viên mickey g?i [phone] s? có nhân viên nói ti?ng Vi?t giúp ð? quí v?. Ðây là d?ch v? mi?n phí .  Italian:  Unser kostenser Dolmetscherservice beantwortet Ihren Fragen zu unserem Gesundheits- und Arzneimittelplan. Unsere Dolmetscher erreichen Sie [phone]. Man wird Ihnen kamala auf [facility]. Dieser Service ist shaistaSaint [facility]'s Hospital of [facility] Springs.  Upper sorbian:  ??? ?? ?? ?? ?? ??? ?? ??? ?? ???? ?? ?? ???? ???? ????. ?? ???? ????? ?? [phone] ??? ??? ????.  ???? ?? ???? ?? ?? ????. ? ???? ??? ?????.   Colombian: Åñëè ó âàñ âîçíèêíóò âîïðîñû îòíîñèòåëüíî ñòðàõîâîãî èëè ìåäèêàìåíòíîãî ïëàíà, âû ìîæåòå âîñïîëüçîâàòüñÿ íàøèìè áåñïëàòíûìè óñëóãàìè ïåðåâîä÷èêîâ. ×òîáû âîñïîëüçîâàòüñÿ óñëóãàìè ïåðåâîä÷èêà, ïîçâîíèòå íàì ïî òåëåôîíó [phone]. Âàì îêàæåò ïîìîùü ñîòðóäíèê, êîòîðûé ãîâîðèò ïî-póññêè. Äàííàÿ óñëóãà áåñïëàòíàÿ.  Welsh: ÅääÇ äÞÏã ÎÏãÇÊ ÇáãÊÑÌã ÇáÝæÑí ÇáãÌÇäíÉ ááÅÌÇÈÉ Úä Ãí ÃÓÆáÉ ÊÊÚáÞ ÈÇáÕÍÉ Ãæ ÌÏæá ÇáÃÏæíÉ áÏíäÇ. ááÍÕæá Úáì ãÊÑÌã ÝæÑí¡ áíÓ Úáíß Óæì ÇáÇÊÕÇá ÈäÇ Úáì [phone] . ÓíÞæã ÔÎÕ ãÇ íÊÍÏË ÇáÚÑÈíÉ ÈãÓÇÚÏÊß. åÐå ÎÏãÉ ãÌÇäíÉ.  Rosa M: ????? ????????? ?? ??? ?? ????? ?? ???? ??? ???? ???? ?? ?????? ?? ???? ???? ?? ??? ????? ??? ????? ???????? ?????? ?????? ???. ?? ???????? ??????? ???? ?? ???, ?? ???? [phone] ?? ??? ????. ??? ??????? ?? ?????? ????? ?? ???? ??? ?? ???? ??. ?? ?? ????? ???? ??.   Turkmen:  È disponibile un servizio di interpretariato gratuito per rispondere a eventuali domande sul nostro piano sanitario e farmaceutico. Per un interprete, contattare il sonia [phone]. Un nostro incaricato yovani parla Italianovi fornirà l'assistenza necessaria. È un servizio gratuito.  Portugués:  Dispomos de serviços de interpretação gratuitos para responder a qualquer questão que tenha acerca do nosso plano de saúde ou de medicação. Para obter um intérprete, contacte-nos através do número [phone]. Irá encontrar alguém que fale o idioma  Português para o ajudar. Taylor serviço é  gratuito.  Lao Creole:  Nou genyen sèvis entèprèt gratis mendez reponn tout kesyon ou ta genyen konsènan plan medikal oswa dwòg nou an.  Mendez jwenn yon entèprèt, jis rele nou nan [phone]. Yon moun ki pale Kreyòl kapab abram w.  Sa a se yon sèvis ki gratis.  Polish:  Umo¿liwiamy bezp³atne skorzystanie z us³ug t³umacza ustnego, który pomo¿e w uzyskaniu odpowiedzi na temat planu zdrowotnego lub dawkowania andresw. Candy skorzystaæ z pomocy t³umacza znaj¹cego mani andrade¿y zadzwoniæ pod numer [phone]. Ta us³uga jest bezp³atna.  Danish: ????? ??????? ????????????????????? ??????????????????????????????????[phone] ???????????????? ? ????????????????? ?????

## 2025-04-25 ENCOUNTER — HOSPITAL ENCOUNTER (OUTPATIENT)
Dept: LAB | Facility: MEDICAL CENTER | Age: 75
End: 2025-04-25
Attending: FAMILY MEDICINE
Payer: MEDICARE

## 2025-04-25 DIAGNOSIS — I15.2 HYPERTENSION ASSOCIATED WITH TYPE 2 DIABETES MELLITUS (HCC): ICD-10-CM

## 2025-04-25 DIAGNOSIS — E11.59 HYPERTENSION ASSOCIATED WITH TYPE 2 DIABETES MELLITUS (HCC): ICD-10-CM

## 2025-04-25 DIAGNOSIS — E78.5 DYSLIPIDEMIA: Chronic | ICD-10-CM

## 2025-04-25 DIAGNOSIS — E11.42 TYPE 2 DIABETES MELLITUS WITH DIABETIC POLYNEUROPATHY, WITHOUT LONG-TERM CURRENT USE OF INSULIN (HCC): ICD-10-CM

## 2025-04-25 DIAGNOSIS — I25.10 CORONARY ARTERY CALCIFICATION SEEN ON CT SCAN: ICD-10-CM

## 2025-04-25 DIAGNOSIS — I72.3 ANEURYSM OF RIGHT ILIAC ARTERY (HCC): Chronic | ICD-10-CM

## 2025-04-25 LAB
ALBUMIN SERPL BCP-MCNC: 4.7 G/DL (ref 3.2–4.9)
ALBUMIN/GLOB SERPL: 1.8 G/DL
ALP SERPL-CCNC: 40 U/L (ref 30–99)
ALT SERPL-CCNC: 22 U/L (ref 2–50)
ANION GAP SERPL CALC-SCNC: 12 MMOL/L (ref 7–16)
AST SERPL-CCNC: 20 U/L (ref 12–45)
BILIRUB SERPL-MCNC: 0.9 MG/DL (ref 0.1–1.5)
BUN SERPL-MCNC: 22 MG/DL (ref 8–22)
CALCIUM ALBUM COR SERPL-MCNC: 9 MG/DL (ref 8.5–10.5)
CALCIUM SERPL-MCNC: 9.6 MG/DL (ref 8.5–10.5)
CHLORIDE SERPL-SCNC: 102 MMOL/L (ref 96–112)
CO2 SERPL-SCNC: 23 MMOL/L (ref 20–33)
CREAT SERPL-MCNC: 1.05 MG/DL (ref 0.5–1.4)
CRP SERPL HS-MCNC: 1.5 MG/L (ref 0–3)
GFR SERPLBLD CREATININE-BSD FMLA CKD-EPI: 75 ML/MIN/1.73 M 2
GLOBULIN SER CALC-MCNC: 2.6 G/DL (ref 1.9–3.5)
GLUCOSE SERPL-MCNC: 137 MG/DL (ref 65–99)
POTASSIUM SERPL-SCNC: 4.2 MMOL/L (ref 3.6–5.5)
PROT SERPL-MCNC: 7.3 G/DL (ref 6–8.2)
SODIUM SERPL-SCNC: 137 MMOL/L (ref 135–145)

## 2025-04-25 PROCEDURE — 83695 ASSAY OF LIPOPROTEIN(A): CPT

## 2025-04-25 PROCEDURE — 82043 UR ALBUMIN QUANTITATIVE: CPT

## 2025-04-25 PROCEDURE — 36415 COLL VENOUS BLD VENIPUNCTURE: CPT

## 2025-04-25 PROCEDURE — 82570 ASSAY OF URINE CREATININE: CPT

## 2025-04-25 PROCEDURE — 82172 ASSAY OF APOLIPOPROTEIN: CPT

## 2025-04-25 PROCEDURE — 86141 C-REACTIVE PROTEIN HS: CPT

## 2025-04-25 PROCEDURE — 80053 COMPREHEN METABOLIC PANEL: CPT

## 2025-04-26 LAB
CREAT UR-MCNC: 26 MG/DL
MICROALBUMIN UR-MCNC: <1.2 MG/DL
MICROALBUMIN/CREAT UR: NORMAL MG/G (ref 0–30)

## 2025-04-28 LAB
APO B100 SERPL-MCNC: 81 MG/DL (ref 66–133)
LPA SERPL-MCNC: 17 MG/DL

## 2025-04-29 ENCOUNTER — OFFICE VISIT (OUTPATIENT)
Dept: MEDICAL GROUP | Facility: MEDICAL CENTER | Age: 75
End: 2025-04-29
Payer: MEDICARE

## 2025-04-29 VITALS
HEART RATE: 62 BPM | WEIGHT: 145 LBS | TEMPERATURE: 97.6 F | SYSTOLIC BLOOD PRESSURE: 122 MMHG | HEIGHT: 63 IN | DIASTOLIC BLOOD PRESSURE: 72 MMHG | RESPIRATION RATE: 12 BRPM | OXYGEN SATURATION: 97 % | BODY MASS INDEX: 25.69 KG/M2

## 2025-04-29 DIAGNOSIS — E11.59 HYPERTENSION ASSOCIATED WITH TYPE 2 DIABETES MELLITUS (HCC): Chronic | ICD-10-CM

## 2025-04-29 DIAGNOSIS — E11.42 TYPE 2 DIABETES MELLITUS WITH DIABETIC POLYNEUROPATHY, WITHOUT LONG-TERM CURRENT USE OF INSULIN (HCC): ICD-10-CM

## 2025-04-29 DIAGNOSIS — E78.5 TYPE 2 DIABETES MELLITUS WITH HYPERLIPIDEMIA (HCC): Chronic | ICD-10-CM

## 2025-04-29 DIAGNOSIS — E11.69 TYPE 2 DIABETES MELLITUS WITH HYPERLIPIDEMIA (HCC): Chronic | ICD-10-CM

## 2025-04-29 DIAGNOSIS — I71.21 ANEURYSM OF ASCENDING AORTA WITHOUT RUPTURE (HCC): ICD-10-CM

## 2025-04-29 DIAGNOSIS — I15.2 HYPERTENSION ASSOCIATED WITH TYPE 2 DIABETES MELLITUS (HCC): Chronic | ICD-10-CM

## 2025-04-29 RX ORDER — AVOBENZONE, HOMOSALATE, OCTISALATE, OCTOCRYLENE 30; 40; 45; 26 MG/ML; MG/ML; MG/ML; MG/ML
CREAM TOPICAL
Qty: 200 EACH | Refills: 3 | Status: SHIPPED | OUTPATIENT
Start: 2025-04-29

## 2025-04-29 ASSESSMENT — FIBROSIS 4 INDEX: FIB4 SCORE: 1.29

## 2025-04-29 NOTE — PROGRESS NOTES
Subjective:     Sveta Knox is a 74 y.o. male presents to discuss:     Chief Complaint   Patient presents with    Diabetes Follow-up     Verbal consent was acquired by the patient to use [employer] ambient listening note generation during this visit Yes   History of Present Illness  The patient presents for follow-up labs, elevated A1c.      A1c increased to 8.6 on most recent blood work.    Patient does admit to indulging.  He has been quite consistent since finding out his most recent A1c results of taking his metformin 3-4 times daily at this time.  He also reports he has been checking his blood sugars.  Needs a refill on strips  Due for monofilament  Completed retinal screening.  Results reviewed.    - His fasting glucose level this morning was 128. Interest in starting Ozempic has been expressed if dietary control proves ineffective. No adverse effects from metformin are reported. He has noted that walking after meals does reduce his overall glucose levels and will continue to do so.    Since last office visit he had completed lung cancer screening and incidental finding of a ascending aorta dilatation in which he has completed a consult with vascular medicine for further evaluation.  Upcoming imaging is scheduled to further evaluate sizing.    - Chart notes reviewed.    - Antihypertensive medication was changed to optimize CV risk.  He is now on losartan/HCTZ 50/12.5.  Denies any side effects and reports blood pressure is controlled.    He had to reschedule his GI consult for colonoscopy.      Lab Results   Component Value Date/Time    HBA1C 8.6 (H) 04/21/2025 0619    HBA1C 6.6 (A) 10/23/2024 0705    HBA1C 7.1 (A) 04/22/2024 0659    HBA1C 6.7 (A) 08/22/2023 0704     ROS: : see above      Current Outpatient Medications:     Blood Glucose Test Strips, Use one Kadeem Contour Next strip to test blood sugar twice daily before meals., Disp: 200 Strip, Rfl: 3    Lancets, Use one Kadeem Contour Next lancet  "to test blood sugar twice daily before meals., Disp: 200 Each, Rfl: 3    VITAMIN D PO, Take  by mouth., Disp: , Rfl:     multivitamin Tab, Take 1 Tablet by mouth every day., Disp: , Rfl:     meloxicam (MOBIC) 15 MG tablet, , Disp: , Rfl:     losartan-hydrochlorothiazide (HYZAAR) 50-12.5 MG per tablet, Take 1 Tablet by mouth every day. To lower blood pressure, Disp: 100 Tablet, Rfl: 3    pravastatin (PRAVACHOL) 40 MG tablet, Take 1 Tablet by mouth every day., Disp: 100 Tablet, Rfl: 3    metFORMIN (GLUCOPHAGE) 500 MG Tab, Take 1 Tablet by mouth 4 times a day., Disp: 400 Tablet, Rfl: 3    Allergies   Allergen Reactions    Atorvastatin      Left sided weakness    Bactine     Keflex     Simvastatin      Left sided weakness       Objective:   Vitals: /72   Pulse 62   Temp 36.4 °C (97.6 °F) (Temporal)   Resp 12   Ht 1.6 m (5' 3\")   Wt 65.8 kg (145 lb)   SpO2 97%   BMI 25.69 kg/m²    General: Alert, pleasant, NAD  HEENT: Normocephalic.  Neck supple.   Respiratory: no distress, no audible wheezing, RR -WNL  Skin: Warm, dry, no rashes.  Extremities: No leg edema. No discoloration  Neurological: No tremors  Psych:  Affect/mood is normal, judgement is good, memory is intact, grooming is appropriate.  Assessment/Plan:      1. Type 2 diabetes mellitus with diabetic polyneuropathy, without long-term current use of insulin ([facility])  - Diabetic Monofilament LE Exam  - Blood Glucose Test Strips; Use one Kadeem Contour Next strip to test blood sugar twice daily before meals.  Dispense: 200 Strip; Refill: 3  - Lancets; Use one Kadeem Contour Next lancet to test blood sugar twice daily before meals.  Dispense: 200 Each; Refill: 3    2. Hypertension associated with type 2 diabetes mellitus ([facility])    3. Type 2 diabetes mellitus with hyperlipidemia ([facility])    4. Aneurysm of ascending aorta without rupture ([facility])       Assessment & Plan  Diabetes Mellitus  His A1c levels have shown an increase,-  A1c now at 8.6.  Treatment plan: He has " been instructed to monitor his blood glucose levels once or twice daily before meals, aiming for fasting levels below 130 and postprandial levels below 180. A prescription for Contour strips and lancets has been provided.  Recommend metformin 1000 mg twice daily with a meal.  Continue working on dietary/lifestyle modifications, regular exercise.  If his blood glucose levels remain uncontrolled, the addition of Ozempic may be considered-although may find more benefit from Jardiance/Farxiga.   - Plan recheck A1c 3 months in clinic.    Hypertension  He is currently on losartan/HCTZ 50/12.5 mg, which he tolerates well without any issues. His blood pressure is well-controlled at 122/72 mmHg.  Treatment plan: He will continue his current medication regimen. Regular monitoring of blood pressure will be maintained to ensure optimal control.    Thoracic Aortic Aneurysm  Incidental finding during lung cancer screening.  Further evaluation with vascular medicine.  Upcoming imaging scheduled.  Chart notes reviewed. The goal is to minimize the risk of expansion by optimizing blood pressure control and monitoring the aneurysm size through regular imaging studies.  Treatment plan: He will follow up with Dr. Grande as scheduled. Surveillance and medication adjustments will be made as necessary to manage the condition.    Follow-up: The patient will follow up in 3 months for an A1c recheck.    Return in about 3 months (around 7/29/2025) for Diabetes.    {I have placed the above orders and discussed them with an approved delegating provider. The MA is performing the below orders under the direction of Dr. Juve LITTLE    Health maintenance:    General Recommendations:   Smoking: recommend complete avoidance of all tobacco products  Alcohol: recommend limiting consumption  Physical Activity: goal is 30 min of moderate activity 5 times a week  Weight Management and Nutrition: high vegetable, high protein  diet like the Mediterranean diet, portion control, avoid excessive sugars, Low Glycemic Index foods, adequate hydration, sleep.

## 2025-05-01 ENCOUNTER — HOSPITAL ENCOUNTER (OUTPATIENT)
Dept: RADIOLOGY | Facility: MEDICAL CENTER | Age: 75
End: 2025-05-01
Attending: FAMILY MEDICINE
Payer: MEDICARE

## 2025-05-01 ENCOUNTER — HOSPITAL ENCOUNTER (OUTPATIENT)
Dept: CARDIOLOGY | Facility: MEDICAL CENTER | Age: 75
End: 2025-05-01
Attending: FAMILY MEDICINE
Payer: MEDICARE

## 2025-05-01 DIAGNOSIS — I70.0 AORTO-ILIAC ATHEROSCLEROSIS (HCC): Chronic | ICD-10-CM

## 2025-05-01 DIAGNOSIS — I70.8 AORTO-ILIAC ATHEROSCLEROSIS (HCC): Chronic | ICD-10-CM

## 2025-05-01 DIAGNOSIS — I72.3 ANEURYSM OF RIGHT ILIAC ARTERY (HCC): Chronic | ICD-10-CM

## 2025-05-01 DIAGNOSIS — I77.810 ASCENDING AORTA DILATION (HCC): Chronic | ICD-10-CM

## 2025-05-01 DIAGNOSIS — E11.42 TYPE 2 DIABETES MELLITUS WITH DIABETIC POLYNEUROPATHY, WITHOUT LONG-TERM CURRENT USE OF INSULIN (HCC): ICD-10-CM

## 2025-05-01 LAB
LV EJECT FRACT  99904: 66
LV EJECT FRACT MOD 2C 99903: 67.93
LV EJECT FRACT MOD 4C 99902: 65.38
LV EJECT FRACT MOD BP 99901: 66.95

## 2025-05-01 PROCEDURE — 71275 CT ANGIOGRAPHY CHEST: CPT

## 2025-05-01 PROCEDURE — 93306 TTE W/DOPPLER COMPLETE: CPT

## 2025-05-01 PROCEDURE — 93306 TTE W/DOPPLER COMPLETE: CPT | Mod: 26 | Performed by: INTERNAL MEDICINE

## 2025-05-01 PROCEDURE — 700117 HCHG RX CONTRAST REV CODE 255: Performed by: FAMILY MEDICINE

## 2025-05-01 RX ORDER — AVOBENZONE, HOMOSALATE, OCTISALATE, OCTOCRYLENE 30; 40; 45; 26 MG/ML; MG/ML; MG/ML; MG/ML
CREAM TOPICAL
Qty: 200 EACH | Refills: 3 | Status: SHIPPED | OUTPATIENT
Start: 2025-05-01

## 2025-05-01 RX ADMIN — IOHEXOL 100 ML: 350 INJECTION, SOLUTION INTRAVENOUS at 13:54

## 2025-05-02 ENCOUNTER — RESULTS FOLLOW-UP (OUTPATIENT)
Dept: VASCULAR LAB | Facility: MEDICAL CENTER | Age: 75
End: 2025-05-02
Payer: MEDICARE

## 2025-05-02 ENCOUNTER — DOCUMENTATION (OUTPATIENT)
Dept: VASCULAR LAB | Facility: MEDICAL CENTER | Age: 75
End: 2025-05-02
Payer: MEDICARE

## 2025-05-02 NOTE — PROGRESS NOTES
CTA chest with 3.8cm ascending aortic size.  Prior sizing 4.0cm.  this is likely minimal if any dilation.   Pending f/u  Will plan for echo surveillance in 2yrs (5/2027) Rn to track

## 2025-05-14 ENCOUNTER — OFFICE VISIT (OUTPATIENT)
Dept: MEDICAL GROUP | Facility: MEDICAL CENTER | Age: 75
End: 2025-05-14
Payer: MEDICARE

## 2025-05-14 VITALS — WEIGHT: 145 LBS | BODY MASS INDEX: 25.69 KG/M2

## 2025-05-14 DIAGNOSIS — E11.69 TYPE 2 DIABETES MELLITUS WITH HYPERLIPIDEMIA (HCC): Primary | Chronic | ICD-10-CM

## 2025-05-14 DIAGNOSIS — E78.5 TYPE 2 DIABETES MELLITUS WITH HYPERLIPIDEMIA (HCC): Primary | Chronic | ICD-10-CM

## 2025-05-14 PROCEDURE — 99404 PREV MED CNSL INDIV APPRX 60: CPT | Performed by: PHARMACIST

## 2025-05-14 RX ORDER — LORATADINE 10 MG/1
10 TABLET ORAL DAILY
COMMUNITY

## 2025-05-14 ASSESSMENT — FIBROSIS 4 INDEX: FIB4 SCORE: 1.31

## 2025-05-14 NOTE — PROGRESS NOTES
Patient Consult Note    TIME IN: 10:00 am  TIME OUT: 11:00 am    Primary care physician: ANABEL Pappas    Reason for Consult: Management of Uncontrolled Type 2 Diabetes    Date Referral Placed: 5/14/25    HPI:  Sveta Knox is a 75 y.o. old patient who comes in today for evaluation of above stated problem.    Allergies  Atorvastatin, Bactine, Keflex, and Simvastatin    Current Diabetes Medication Regimen  Metformin IR: 1000 mg BID    Previous Diabetes Medications and Reason for Discontinuation  None    Preventative Medications  On ACEi/ARB: yes - Losartan 50 mg once daily  On statin or other lipid-lowering therapy: yes - pravastatin 40 mg once daily    Potential Barriers to Care:  Adherence: denies missed doses  Side effects: none  Affordability: No complaints    SMBG  Pt has home glucometer and proper testing technique - Yes    Pt reports blood sugars:   Before Breakfast:       Hyperglycemia/Hypoglycemia  Hyperglycemia symptoms: None  Hypoglycemia awareness: No   Nocturnal hypoglycemia: None  Hypoglycemia:  None  Pt's treatment of Hypoglycemia: Discussed 15:15 Rule    Lifestyle  Current Exercise - No purposeful activity. Walks ~ 1-4 miles daily. Encouraged to start 150 min per week of anything aerobic/moderate intensity.    Dietary - Eats 3 meals daily. Was eating ice cream, bread, noodles, rice. After recent A1c he Dc'd these habits.  Breakfast - Eggs with potatoes and head/sausage  Lunch - Soup (chicken noodle), clint sausage  Dinner - Varies. Chicken, vegetables (egg plant, broccoli), tofu. Beef stew, salmon, beef stroganoff.   Snacks - Pork rinds, bread substitute with cheese  Drinks - Water, coffee (black), one can of decaf Coke Zero    Labs  Lab Results   Component Value Date/Time    HBA1C 8.6 (H) 04/21/2025 06:19 AM    HBA1C 6.6 (A) 10/23/2024 07:05 AM    HBA1C 7.1 (A) 04/22/2024 06:59 AM      Lab Results   Component Value Date/Time    SODIUM 137 04/25/2025 09:22 AM     POTASSIUM 4.2 04/25/2025 09:22 AM    CHLORIDE 102 04/25/2025 09:22 AM    CO2 23 04/25/2025 09:22 AM    GLUCOSE 137 (H) 04/25/2025 09:22 AM    BUN 22 04/25/2025 09:22 AM    CREATININE 1.05 04/25/2025 09:22 AM     Lab Results   Component Value Date/Time    ALKPHOSPHAT 40 04/25/2025 09:22 AM    ASTSGOT 20 04/25/2025 09:22 AM    ALTSGPT 22 04/25/2025 09:22 AM    TBILIRUBIN 0.9 04/25/2025 09:22 AM    ALBUMIN 4.7 04/25/2025 09:22 AM      Lab Results   Component Value Date/Time    CHOLSTRLTOT 174 04/21/2025 06:19 AM     (H) 04/21/2025 06:19 AM    HDL 61 04/21/2025 06:19 AM    TRIGLYCERIDE 67 04/21/2025 06:19 AM       Lab Results   Component Value Date/Time    MALBCRT see below 04/25/2025 09:23 AM    MICROALBUR <1.2 04/25/2025 09:23 AM       Physical Examination:  Vital signs: Wt 65.8 kg (145 lb)   BMI 25.69 kg/m²  Body mass index is 25.69 kg/m².    Assessment and Plan:    1. DM2  Basic physiology of DMII was explained to patient as well as microvascular/macrovascular complications. The importance of increasing physical activity to improve diabetes control was discussed with the patient. Patient was also educated on changing diet and making better choices to help control blood sugar.   Discussed Goals: FBG 80 - 130, 2hPP < 180, a1c < 7.0%  Last a1c drawn on 4/21/25 was 8.6%, which is not at goal and has worsened since the previous reading (6.6% on 10/2024).  Pt currently SMFBG w/ supplies he's gotten from [employer] (reports Rx'd supplies were too costly). Readings are essentially now at goal.  Pt very aware of his T2DM dx and appropriate lifestyle modification to manage. He reports that since his last A1c that he's made significant dietary changes. He is amenable to increasing his physical activity as well.  Goal LDL-C <70 mg/dl per Dr. Grande, LDL is not at goal. Pt is on moderate intensity statin. Discussed initiation of ezetimibe vs bempedoic acid. Pt has Vasc f/u tomorrow to discuss further.  UACR WNL, pt is on  ARB.    - Medication changes:  None   - Continue:  Metformin IR 1000 mg BID     - Lifestyle changes:  Diet: Maximize lean proteins and veggies. Cut out/down on carbs. Avoid simple sugars.   Exercise: Increase as tolerated. Aim for at least 150 min/week of anything aerobic.    - Preventative management:  REC DM Score: 2  Care gaps addressed:   A1c is above 8%: Optimized DM medications/management  LDL is above 100: Optimized lipid medications/management  Care gaps updated in Health Maintenance    Follow Up:  Will f/u via [facility] pending repeat lipids and A1c - back in clinic if they remain above goal    Ben Lentz, PharmD, BCACP    CC:   ANABEL Pappas

## 2025-05-15 ENCOUNTER — OFFICE VISIT (OUTPATIENT)
Dept: VASCULAR LAB | Facility: MEDICAL CENTER | Age: 75
End: 2025-05-15
Attending: FAMILY MEDICINE
Payer: MEDICARE

## 2025-05-15 VITALS
BODY MASS INDEX: 25.59 KG/M2 | SYSTOLIC BLOOD PRESSURE: 108 MMHG | DIASTOLIC BLOOD PRESSURE: 62 MMHG | HEIGHT: 63 IN | HEART RATE: 74 BPM | WEIGHT: 144.4 LBS

## 2025-05-15 DIAGNOSIS — I70.0 AORTO-ILIAC ATHEROSCLEROSIS (HCC): ICD-10-CM

## 2025-05-15 DIAGNOSIS — I70.8 AORTO-ILIAC ATHEROSCLEROSIS (HCC): ICD-10-CM

## 2025-05-15 DIAGNOSIS — I15.2 HYPERTENSION ASSOCIATED WITH TYPE 2 DIABETES MELLITUS (HCC): ICD-10-CM

## 2025-05-15 DIAGNOSIS — E11.59 HYPERTENSION ASSOCIATED WITH TYPE 2 DIABETES MELLITUS (HCC): ICD-10-CM

## 2025-05-15 DIAGNOSIS — I77.810 ASCENDING AORTA DILATION (HCC): Primary | ICD-10-CM

## 2025-05-15 DIAGNOSIS — E11.42 TYPE 2 DIABETES MELLITUS WITH DIABETIC POLYNEUROPATHY, WITHOUT LONG-TERM CURRENT USE OF INSULIN (HCC): ICD-10-CM

## 2025-05-15 DIAGNOSIS — E78.5 DYSLIPIDEMIA: ICD-10-CM

## 2025-05-15 DIAGNOSIS — I25.10 CORONARY ARTERY CALCIFICATION SEEN ON CT SCAN: Chronic | ICD-10-CM

## 2025-05-15 PROCEDURE — 3074F SYST BP LT 130 MM HG: CPT | Performed by: FAMILY MEDICINE

## 2025-05-15 PROCEDURE — 3078F DIAST BP <80 MM HG: CPT | Performed by: FAMILY MEDICINE

## 2025-05-15 PROCEDURE — 99212 OFFICE O/P EST SF 10 MIN: CPT | Performed by: FAMILY MEDICINE

## 2025-05-15 PROCEDURE — 99214 OFFICE O/P EST MOD 30 MIN: CPT | Performed by: FAMILY MEDICINE

## 2025-05-15 PROCEDURE — G2211 COMPLEX E/M VISIT ADD ON: HCPCS | Performed by: FAMILY MEDICINE

## 2025-05-15 RX ORDER — EZETIMIBE 10 MG/1
10 TABLET ORAL DAILY
Qty: 100 TABLET | Refills: 3 | Status: SHIPPED | OUTPATIENT
Start: 2025-05-15

## 2025-05-15 ASSESSMENT — ENCOUNTER SYMPTOMS
PALPITATIONS: 0
FEVER: 0
ORTHOPNEA: 0
PND: 0
SPUTUM PRODUCTION: 0
WHEEZING: 0
SHORTNESS OF BREATH: 0
CHILLS: 0
COUGH: 0
HEMOPTYSIS: 0
CLAUDICATION: 0

## 2025-05-15 ASSESSMENT — FIBROSIS 4 INDEX: FIB4 SCORE: 1.31

## 2025-05-15 NOTE — PROGRESS NOTES
FOLLOW-UP VASCULAR MEDICINE CLINIC  05/15/25     Sveta Velazquez Look,  eval/med mgmt of ascending aortic dilation/aneurysm (AsAA), est 11/2024  Referring provider: Brandee Walsh*     Subjective    Interval hx/concerns: last seen 11/2024, stable, no new sx, tolerating meds   Lp(a) 17, apoB 81  CTA 3.8cm ascending Ao  Echo stable with TAV    As Ao dilation: Denies current chest, back, abdominal pain, SOB, dysphagia, worsening cough, hemoptysis.  Pertinent pmhx:  Initial visit hx/sx: seen by PCP in 10/2024, had CT chest for lung CA screening in light of hx of tobacco use.    4.0cm, initial noted size on LDCT (non-contrast)  HTN: Yes, Details: on meds see below   Hx of tobacco:   reports that he quit smoking about 38 years ago. His smoking use included cigarettes. He started smoking about 58 years ago. He has a 60 pack-year smoking history. He has never used smokeless tobacco.  Hx of connective tissue d/o (eg. Marfans, David-Danlos, Loeys-Pineda): no  Hx of inflammatory / autoimmune vasculitis (Takayasu's arteritis, Giant Cell arteritis): no   Hx of infective aortitis, HIV, syphilis: no   Hx of MVA, chest wall trauma, deceleration injuries: no   Hx of Biscuspid Aortic Valve:  pending echo   Hx of anabolic steroid use or legal or illicit stimulants: no  Pertinent famhx:  Connective tissue disorders: no   Aneurysmal disease or known hereditary thoracic aneurysmal disease: no    HTN: Stable, tolerating meds with good adherence, Home BP log: not checking    HLD: Stable, current treatment: Moderate intensity statin - tolerating, good adherence.   Failed atorva, no other LLT Rx in past     Dysglycemia: yes, T2D, mgmt per PCP     Antithrombotic tx: none - no hx of bleeding      Current Outpatient Medications on File Prior to Visit   Medication Sig Dispense Refill    loratadine (CLARITIN) 10 MG Tab Take 10 mg by mouth every day.      Blood Glucose Meter Kit Test blood sugar as recommended by provider. One blood  "glucose monitoring kit. 1 Kit 0    Lancets Use one  lancet to test blood sugar twice daily before meals. 200 Each 3    Blood Glucose Test Strips Use one Kadeem Contour Next strip to test blood sugar twice daily before meals. 200 Strip 3    VITAMIN D PO Take  by mouth.      multivitamin Tab Take 1 Tablet by mouth every day.      meloxicam (MOBIC) 15 MG tablet       losartan-hydrochlorothiazide (HYZAAR) 50-12.5 MG per tablet Take 1 Tablet by mouth every day. To lower blood pressure 100 Tablet 3    pravastatin (PRAVACHOL) 40 MG tablet Take 1 Tablet by mouth every day. 100 Tablet 3    metFORMIN (GLUCOPHAGE) 500 MG Tab Take 1 Tablet by mouth 4 times a day. 400 Tablet 3     No current facility-administered medications on file prior to visit.      Allergies   Allergen Reactions    Atorvastatin      Left sided weakness    Bactine     Keflex     Simvastatin      Left sided weakness      Social History     Tobacco Use    Smoking status: Former     Current packs/day: 0.00     Average packs/day: 3.0 packs/day for 20.0 years (60.0 ttl pk-yrs)     Types: Cigarettes     Start date:      Quit date:      Years since quittin.3    Smokeless tobacco: Never   Vaping Use    Vaping status: Never Used   Substance Use Topics    Alcohol use: Not Currently     Comment: about 1 drink/week    Drug use: No     DIET AND EXERCISE:  Weight Change: stable   Diet: common adult  Exercise: moderate regular exercise program     Review of Systems   Constitutional:  Negative for chills, fever and malaise/fatigue.   Respiratory:  Negative for cough, hemoptysis, sputum production, shortness of breath and wheezing.    Cardiovascular:  Negative for chest pain, palpitations, orthopnea, claudication, leg swelling and PND.          Objective    Vitals:    05/15/25 0848   BP: 108/62   BP Location: Left arm   Patient Position: Sitting   BP Cuff Size: Small adult   Pulse: 74   Weight: 65.5 kg (144 lb 6.4 oz)   Height: 1.6 m (5' 3\")      BP Readings from " Last 4 Encounters:   05/15/25 108/62   04/29/25 122/72   02/11/25 120/62   01/18/25 120/66      Body mass index is 25.58 kg/m².   Wt Readings from Last 4 Encounters:   05/15/25 65.5 kg (144 lb 6.4 oz)   05/14/25 65.8 kg (145 lb)   04/29/25 65.8 kg (145 lb)   02/11/25 63.5 kg (140 lb)      Physical Exam  Constitutional:       General: He is not in acute distress.     Appearance: Normal appearance. He is not diaphoretic.   HENT:      Head: Normocephalic and atraumatic.   Eyes:      Conjunctiva/sclera: Conjunctivae normal.   Cardiovascular:      Rate and Rhythm: Normal rate and regular rhythm.      Pulses:           Carotid pulses are 2+ on the right side and 2+ on the left side.       Radial pulses are 2+ on the right side and 2+ on the left side.        Dorsalis pedis pulses are 2+ on the right side and 2+ on the left side.        Posterior tibial pulses are 2+ on the right side and 2+ on the left side.      Heart sounds: Normal heart sounds.   Pulmonary:      Effort: Pulmonary effort is normal.      Breath sounds: Normal breath sounds.   Musculoskeletal:      Right lower leg: No edema.      Left lower leg: No edema.   Skin:     General: Skin is warm and dry.   Neurological:      Mental Status: He is alert and oriented to person, place, and time.      Cranial Nerves: No cranial nerve deficit.      Gait: Gait is intact.   Psychiatric:         Mood and Affect: Mood and affect normal.          DATA REVIEW    Lab Results   Component Value Date/Time    CHOLSTRLTOT 174 04/21/2025 06:19 AM     (H) 04/21/2025 06:19 AM    HDL 61 04/21/2025 06:19 AM    TRIGLYCERIDE 67 04/21/2025 06:19 AM       Lab Results   Component Value Date/Time     (H) 04/21/2025 06:19 AM     (H) 10/15/2024 06:33 AM     (H) 09/06/2023 06:29 AM    LDL 93 08/31/2022 10:47 AM     (H) 08/02/2021 06:44 AM     Lab Results   Component Value Date/Time    LIPOPROTA 17 04/25/2025 09:22 AM      Lab Results   Component Value  Date/Time    APOB 81 2025 09:22 AM      Lab Results   Component Value Date/Time    CRPHIGHSEN 1.5 2025 09:22 AM        Lab Results   Component Value Date/Time    SODIUM 137 2025 09:22 AM    POTASSIUM 4.2 2025 09:22 AM    CHLORIDE 102 2025 09:22 AM    CO2 23 2025 09:22 AM    GLUCOSE 137 (H) 2025 09:22 AM    BUN 22 2025 09:22 AM    CREATININE 1.05 2025 09:22 AM     Lab Results   Component Value Date/Time    ALKPHOSPHAT 40 2025 09:22 AM    ASTSGOT 20 2025 09:22 AM    ALTSGPT 22 2025 09:22 AM    TBILIRUBIN 0.9 2025 09:22 AM       Lab Results   Component Value Date/Time    HBA1C 8.6 (H) 2025 06:19 AM    HBA1C 6.6 (A) 10/23/2024 07:05 AM    HBA1C 7.1 (A) 2024 06:59 AM       Lab Results   Component Value Date/Time    MALBCRT see below 2025 09:23 AM    MICROALBUR <1.2 2025 09:23 AM       Imagin AAA duplex   There is mild atherosclerosis of the visualized abdominal aorta.   Proximal abdominal aorta measures 2.38 cm x 2.1 cm.   Mid abdominal aorta measures 1.96 cm x 2.1 cm.   Distal abdominal aorta measures 1.62 cm x 1.7 cm.   The right common iliac artery measures 1.46 cm x 1 cm, the left 1.30 cm x 0.9 cm.   No incidental abnormalities in the visualized portions of the retroperitoneum are identified.   IMPRESSION:   Mildly atherosclerotic aorta without aneurysmal dilatation.    2024 LDCT w/o   Cardiac: Heart normal in size There is coronary artery calcification.  Vascular: 4 cm fusiform ascending thoracic aortic aneurysm.  IMPRESSION:   1. No evidence of malignancy.  2. 4 cm ascending thoracic aortic aneurysm.  3. Coronary artery calcifications.    2025 CTA complete Ao   1.  No aortic aneurysm or dissection identified. The ascending aorta measures 3.5 x 3.8 cm  2.  No pulmonary consolidation or pleural effusion.  3.  No evidence of bowel obstruction or acute appendicitis. No free fluid.  Heart: Normal size. No  "pericardial effusion. There are coronary artery calcifications.  Common iliac arteries: Nonaneurysmal and patent.    5/2025 Echo   No prior study is available for comparison.   Normal left ventricular systolic function.   No evidence of valvular abnormality based on Doppler evaluation.   Normal aortic root for body surface area. The ascending aorta is dilated with a diameter of 4.0 cm.  Tricuspid aortic valve. No aortic valve stenosis. Trace aortic   insufficiency.      PROCEDURES: NONE         Medical Decision Making:  Today's Assessment / Status / Plan:     1. Ascending aorta dilation (HCC)        2. Aorto-iliac atherosclerosis (HCC)        3. Hypertension associated with type 2 diabetes mellitus (HCC)  CBC WITHOUT DIFFERENTIAL    Comp Metabolic Panel    Lipid Profile    MICROALBUMIN CREAT RATIO URINE      4. Type 2 diabetes mellitus with diabetic polyneuropathy, without long-term current use of insulin (HCC)  APOLIPOPROTEIN B    HEMOGLOBIN A1C      5. Coronary artery calcification seen on CT scan        6. 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, HTN, hx of tobacco; likely some degree of atherosclerosis is contributory  - no known hereditary thoracic aortic disease (HTAD)  or non-syndromic (nsHTAD) in pmhx or fhx  11/2024 LDCT = 4.0cm - INITIAL sizing - considered \"dilation\" only per [place] Ao calculator  5/2025 Echo = 4.0cm    CTA = 3.8cm (sizing discrepancy favors CT) - consider minimal dilation  Plan:   Lifestyle mgmt:  - continue healthy lifestyle choices as noted below and avoid all tobacco products   - activity restrictions to reduce risk of aneurysmal expansion include avoidance of the following:  long duration high intensity cardio, extreme endurance activities, high-intensity strength training " ">20-30lbs, and avoid straining or holding breath when lifting  Med mgmt:   - focus BB and ARB as 1st line HTN agents  - treatment of dyslipidemia, dysglycemia   - avoid all prescription or illicit stimulants   Screening/surveillance:  - recommended for lifelong surveillance  - q2yr echo surveillance (next 5/2027), and consider CTA q3-5yr or if indications of rapid expansion noted   Surgery mgmt  - as reviewed with pt, surg repair indicated if 5.5+cm or rapid expansion for sporadic or nsHTAD TAAs (>0.5+ cm/yr or >0.3 cm/yr in 2 consecutive years)    2) Non-aneurysmal aortic atherosclerosis (AS) per CT -  no symptoms or prior known associated clinical manifestations  or indications of complex features  - general indicator of systemic AS with higher potential AS in other vascular beds, possible indicator of higher overall ASCVD risk   Plan:  - no further imaging surveillance, continue med mgmt      LIPID MANAGEMENT  Qualifies for Statin Therapy per ACC/AHA Guidelines: yes, Diabetes  The 10-year ASCVD risk score (Fernando LIMA, et al., 2019) is: 30.2%, >20% \"high risk\"   Major ASCVD events: None    High-risk conditions: N/A  Risk-enhancers: Metabolic syndrome   Currently on Statin: Yes  Tx goals: LDL-C <70   At goal?  No, 4/2025   Plan:   - continue pravastatin 40mg daily,  has partial intolerance due to atorva failure  - continue zetia 10mg daily   - monitor labs annually     BLOOD PRESSURE MANAGEMENT  Office BP goal per ACC/AHA <130/80   Home BP at goal:  yes  Office BP at goal:  yes  24h ABPM:  not ordered to date   RDN candidate? NO  Contributing factors: n/a    BP meds recommended for reduction of expansion rate of TAA:   - Beta-blocker (anti-impulse therapy to decrease pulsatile wall stress)   - ARB (inhibits TGF-beta activity in aortic wall,, most widely studied is losartan)   Plan:   - start/continue home BP monitoring, reviewed correct technique, provide BP log and instructions  Medications:  - continue " losartan/hctz 50mg/12.5mg qAM    GLYCEMIC MANAGEMENT Diabetic, T2 with HTN, vasc dz   Goal A1c < 7.0  Lab Results   Component Value Date/Time    HBA1C 8.6 (H) 04/21/2025 06:19 AM    HBA1C 6.6 (A) 10/23/2024 07:05 AM    HBA1C 7.1 (A) 04/22/2024 06:59 AM       Lab Results   Component Value Date/Time    MALBCRT see below 04/25/2025 09:23 AM    MICROALBUR <1.2 04/25/2025 09:23 AM   Plan:  - continue current medication plan per PCP  - recommmend for routine care with PCP (or endocrine) to include regular A1c monitoring, annual albumin/creatinine ratio (ACR), annual diabetic retinopathy screening, foot exams, annual flu vaccine, and updates to pneumonia vaccines as appropriate      ANTITHROMBOTIC THERAPY   - not currently indicated     LIFESTYLE INTERVENTIONS  TOBACCO: Stages of Change: Maintenance (sustained change >6mo)    reports that he quit smoking about 38 years ago. His smoking use included cigarettes. He started smoking about 58 years ago. He has a 60 pack-year smoking history. He has never used smokeless tobacco.   - continued complete avoidance of all tobacco products     NUTRITION: Mediterranean, reduce Na to 1500mg/day, and Diabetic diet - reduce CHO and kcal      ETOH: limit to 2 or less standard drinks/day     WT MGMT: maintain healthy weight     PHYSICAL ACTIVITY: as noted above   As per 2022 ACC/AHA guideline for aortic disease regarding exercise:   - In patients with aortic disease, limited data are available to guide recommendations regarding the forms of exercise that are safe and promote cardiovascular health versus those that pose an acute or  long-term risk of aortic growth or rupture. But evidence exists regarding the physiologic benefits of exercise and the hemodynamic consequences of various form of exercise and exertion in case series and relevant animal models.   - consensus among numerous expert committees that it is wise to avoid intense isometric exertion or exercises that require the  Valsalva maneuver, given that heavy lifting with Valsalva can produce acute increases in SBP to >300 mm Hg.   - consensus that light weightlifting and low-intensity aerobic exercise are safe and likely improve both physical and mental health.   - No uniform consensus exists about the safety of intermediate-level static and aerobic exercise.   - Recommendations for exercise intensity are best individualized informed by multiple factors that include underlying aortic pathology, aortic diameter and ASI, aortic growth rate, age, family history, and any other high-risk features (eg, uncontrolled hypertension).  - Ongoing investigation is needed to better define the levels of resistance activities that would be considered low-risk for adverse aortic events, favoring greater exercise restrictions among patients at higher risk of dissection     OTHER:  none     STUDIES:   5/2027 Echo -  RN to track/order   FOLLOW-UP: 6 months with ongoing care with JOSE Grande M.D.   [facility]  [facility]  [phone]    Cc:

## 2025-05-17 DIAGNOSIS — E11.42 TYPE 2 DIABETES MELLITUS WITH DIABETIC POLYNEUROPATHY, WITHOUT LONG-TERM CURRENT USE OF INSULIN (HCC): ICD-10-CM

## 2025-05-17 DIAGNOSIS — E78.5 HYPERLIPIDEMIA, UNSPECIFIED HYPERLIPIDEMIA TYPE: ICD-10-CM

## 2025-05-21 RX ORDER — PRAVASTATIN SODIUM 40 MG
40 TABLET ORAL
Qty: 100 TABLET | Refills: 3 | Status: SHIPPED | OUTPATIENT
Start: 2025-05-21

## 2025-08-12 ENCOUNTER — OFFICE VISIT (OUTPATIENT)
Dept: MEDICAL GROUP | Facility: MEDICAL CENTER | Age: 75
End: 2025-08-12
Payer: MEDICARE

## 2025-08-12 VITALS
HEART RATE: 72 BPM | HEIGHT: 63 IN | DIASTOLIC BLOOD PRESSURE: 52 MMHG | OXYGEN SATURATION: 95 % | BODY MASS INDEX: 25.08 KG/M2 | TEMPERATURE: 97.3 F | RESPIRATION RATE: 14 BRPM | SYSTOLIC BLOOD PRESSURE: 114 MMHG | WEIGHT: 141.54 LBS

## 2025-08-12 DIAGNOSIS — E11.59 HYPERTENSION ASSOCIATED WITH TYPE 2 DIABETES MELLITUS (HCC): Chronic | ICD-10-CM

## 2025-08-12 DIAGNOSIS — M19.042 ARTHRITIS OF BOTH HANDS: ICD-10-CM

## 2025-08-12 DIAGNOSIS — M19.041 ARTHRITIS OF BOTH HANDS: ICD-10-CM

## 2025-08-12 DIAGNOSIS — R39.9 LOWER URINARY TRACT SYMPTOMS (LUTS): ICD-10-CM

## 2025-08-12 DIAGNOSIS — I15.2 HYPERTENSION ASSOCIATED WITH TYPE 2 DIABETES MELLITUS (HCC): Chronic | ICD-10-CM

## 2025-08-12 DIAGNOSIS — I77.810 ASCENDING AORTA DILATION (HCC): Chronic | ICD-10-CM

## 2025-08-12 DIAGNOSIS — E11.42 TYPE 2 DIABETES MELLITUS WITH DIABETIC POLYNEUROPATHY, WITHOUT LONG-TERM CURRENT USE OF INSULIN (HCC): Primary | ICD-10-CM

## 2025-08-12 LAB
HBA1C MFR BLD: 6.4 % (ref ?–5.8)
POCT INT CON NEG: NEGATIVE
POCT INT CON POS: POSITIVE

## 2025-08-12 PROCEDURE — 83036 HEMOGLOBIN GLYCOSYLATED A1C: CPT | Performed by: NURSE PRACTITIONER

## 2025-08-12 PROCEDURE — 3078F DIAST BP <80 MM HG: CPT | Performed by: NURSE PRACTITIONER

## 2025-08-12 PROCEDURE — 99214 OFFICE O/P EST MOD 30 MIN: CPT | Performed by: NURSE PRACTITIONER

## 2025-08-12 PROCEDURE — 3074F SYST BP LT 130 MM HG: CPT | Performed by: NURSE PRACTITIONER

## 2025-08-12 PROCEDURE — G2211 COMPLEX E/M VISIT ADD ON: HCPCS | Performed by: NURSE PRACTITIONER

## 2025-08-12 RX ORDER — MELOXICAM 15 MG/1
15 TABLET ORAL
Qty: 90 TABLET | Refills: 3 | Status: SHIPPED | OUTPATIENT
Start: 2025-08-12

## 2025-08-12 ASSESSMENT — FIBROSIS 4 INDEX: FIB4 SCORE: 1.31
